# Patient Record
Sex: FEMALE | Race: WHITE | Employment: OTHER | URBAN - METROPOLITAN AREA
[De-identification: names, ages, dates, MRNs, and addresses within clinical notes are randomized per-mention and may not be internally consistent; named-entity substitution may affect disease eponyms.]

---

## 2018-03-12 ENCOUNTER — OFFICE VISIT (OUTPATIENT)
Dept: SURGERY | Age: 58
End: 2018-03-12
Payer: MEDICARE

## 2018-03-12 ENCOUNTER — TELEPHONE (OUTPATIENT)
Dept: SURGERY | Age: 58
End: 2018-03-12

## 2018-03-12 VITALS — SYSTOLIC BLOOD PRESSURE: 162 MMHG | HEART RATE: 83 BPM | DIASTOLIC BLOOD PRESSURE: 78 MMHG | TEMPERATURE: 99.6 F

## 2018-03-12 DIAGNOSIS — K94.19 FISTULA OF ENTEROSTOMY (HCC): Primary | ICD-10-CM

## 2018-03-12 PROCEDURE — G8427 DOCREV CUR MEDS BY ELIG CLIN: HCPCS | Performed by: SURGERY

## 2018-03-12 PROCEDURE — 3017F COLORECTAL CA SCREEN DOC REV: CPT | Performed by: SURGERY

## 2018-03-12 PROCEDURE — 99204 OFFICE O/P NEW MOD 45 MIN: CPT | Performed by: SURGERY

## 2018-03-12 PROCEDURE — 1036F TOBACCO NON-USER: CPT | Performed by: SURGERY

## 2018-03-12 PROCEDURE — 3014F SCREEN MAMMO DOC REV: CPT | Performed by: SURGERY

## 2018-03-12 PROCEDURE — G8421 BMI NOT CALCULATED: HCPCS | Performed by: SURGERY

## 2018-03-12 PROCEDURE — G8484 FLU IMMUNIZE NO ADMIN: HCPCS | Performed by: SURGERY

## 2018-03-12 RX ORDER — SULFAMETHOXAZOLE AND TRIMETHOPRIM 800; 160 MG/1; MG/1
1 TABLET ORAL 2 TIMES DAILY
Qty: 20 TABLET | Refills: 0 | Status: SHIPPED | OUTPATIENT
Start: 2018-03-12 | End: 2018-03-22

## 2018-03-12 RX ORDER — METRONIDAZOLE 500 MG/1
500 TABLET ORAL 3 TIMES DAILY
Qty: 30 TABLET | Refills: 0 | Status: SHIPPED | OUTPATIENT
Start: 2018-03-12 | End: 2018-03-22

## 2018-03-12 NOTE — PROGRESS NOTES
visit. Allergies   Allergen Reactions    Asa [Aspirin]      sensitivity    Codeine      Vomiting not sure if caused by codeine    Tape Anabela Carter Tape] Other (See Comments)     Peels skin off       The patient has a family history that is negative for severe cardiovascular or respiratory issues, negative for reaction to anesthesia. Social History     Social History    Marital status:      Spouse name: N/A    Number of children: N/A    Years of education: N/A     Occupational History    Not on file.      Social History Main Topics    Smoking status: Former Smoker    Smokeless tobacco: Not on file      Comment: quit smoking 17 yrs ago    Alcohol use No      Comment: rarely    Drug use: No    Sexual activity: No     Other Topics Concern    Not on file     Social History Narrative    No narrative on file           Review of Systems  Review of Systems -  General ROS: negative for - chills, fatigue or malaise  ENT ROS: negative for - hearing change, nasal congestion or nasal discharge  Allergy and Immunology ROS: negative for - hives, itchy/watery eyes or nasal congestion  Hematological and Lymphatic ROS: negative for - blood clots, blood transfusions, bruising or fatigue  Endocrine ROS: negative for - malaise/lethargy, mood swings, palpitations or polydipsia/polyuria  Respiratory ROS: negative for - sputum changes, stridor, tachypnea or wheezing  Cardiovascular ROS: negative for - irregular heartbeat, loss of consciousness, murmur or orthopnea  Gastrointestinal ROS: negative for - constipation, diarrhea, gas/bloating, heartburn or hematemesis  Genito-Urinary ROS: negative for -  genital discharge, genital ulcers or hematuria  Musculoskeletal ROS: negative for - gait disturbance, muscle pain or muscular weakness    Physical exam:   BP (!) 162/78   Pulse 83   Temp 99.6 °F (37.6 °C)   General appearance:  NAD  Head: NCAT, PERRLA, EOMI, red conjunctiva  Neck: supple, no masses  Lungs: CTAB, equal chest rise bilateral  Heart: Reg rate  Abdomen: soft, nontender, nondistended, [id number] wound in LLQ  Skin; no lesions  Gu: no cva tenderness  Extremities: extremities normal, atraumatic, no cyanosis or edema      Radiology:  CT abdomen/pelvis: ordered    Assessment:  62 y.o. female with draining abdominal wound likely fistula    Plan: Will give abx and get CT abd to assess.   Betsey Baca MD  1:33 PM  3/12/2018

## 2018-03-12 NOTE — TELEPHONE ENCOUNTER
Per the order of Dr. Agustin Rogers, patient has been scheduled for CT Scan at [facility] on 3.13.18. Patient provided with instructions for testing during office visit and verbalized understanding. Patient scheduled to return to the office on 3.19.18 to review results of testing with Dr. Agustin Rogers. Patient also provided with a prescription to have her labs drawn piror to CT Scan. Patient also provided with verbal instructions during office visit along with order for CT Scan. Patient has Graeme Mccarty and no authorization is required for CT Scan.   Electronically signed by Reinaldo Fuller on 3/12/18 at 3:06 PM

## 2018-03-13 ENCOUNTER — HOSPITAL ENCOUNTER (OUTPATIENT)
Dept: CT IMAGING | Age: 58
Discharge: HOME | End: 2018-03-13
Payer: MEDICARE

## 2018-03-13 DIAGNOSIS — K94.19 FISTULA OF ENTEROSTOMY (HCC): ICD-10-CM

## 2018-03-13 LAB
ANION GAP SERPL CALCULATED.3IONS-SCNC: 10 MMOL/L (ref 7–16)
BUN BLDV-MCNC: 16 MG/DL (ref 6–20)
CALCIUM SERPL-MCNC: 9.1 MG/DL (ref 8.6–10.2)
CHLORIDE BLD-SCNC: 95 MMOL/L (ref 98–107)
CO2: 30 MMOL/L (ref 22–29)
CREAT SERPL-MCNC: 1 MG/DL (ref 0.5–1)
GFR AFRICAN AMERICAN: >60
GFR NON-AFRICAN AMERICAN: 57 ML/MIN/1.73
GLUCOSE BLD-MCNC: 454 MG/DL (ref 74–109)
POTASSIUM SERPL-SCNC: 4.3 MMOL/L (ref 3.5–5)
SODIUM BLD-SCNC: 135 MMOL/L (ref 132–146)

## 2018-03-13 PROCEDURE — 6360000004 HC RX CONTRAST MEDICATION: Performed by: RADIOLOGY

## 2018-03-13 PROCEDURE — 36415 COLL VENOUS BLD VENIPUNCTURE: CPT

## 2018-03-13 PROCEDURE — 80048 BASIC METABOLIC PNL TOTAL CA: CPT

## 2018-03-13 PROCEDURE — 74177 CT ABD & PELVIS W/CONTRAST: CPT

## 2018-03-13 RX ADMIN — IOPAMIDOL 80 ML: 755 INJECTION, SOLUTION INTRAVENOUS at 14:19

## 2018-03-13 RX ADMIN — IOHEXOL 50 ML: 240 INJECTION, SOLUTION INTRATHECAL; INTRAVASCULAR; INTRAVENOUS; ORAL at 14:18

## 2018-03-19 ENCOUNTER — OFFICE VISIT (OUTPATIENT)
Dept: SURGERY | Age: 58
End: 2018-03-19
Payer: MEDICARE

## 2018-03-19 ENCOUNTER — TELEPHONE (OUTPATIENT)
Dept: SURGERY | Age: 58
End: 2018-03-19

## 2018-03-19 VITALS
TEMPERATURE: 97.8 F | HEIGHT: 64 IN | HEART RATE: 59 BPM | BODY MASS INDEX: 50.02 KG/M2 | DIASTOLIC BLOOD PRESSURE: 71 MMHG | SYSTOLIC BLOOD PRESSURE: 157 MMHG | WEIGHT: 293 LBS

## 2018-03-19 DIAGNOSIS — S31.109S OPEN ABDOMINAL WALL WOUND, SEQUELA: Primary | ICD-10-CM

## 2018-03-19 PROCEDURE — 99212 OFFICE O/P EST SF 10 MIN: CPT | Performed by: SURGERY

## 2018-03-19 PROCEDURE — G8427 DOCREV CUR MEDS BY ELIG CLIN: HCPCS | Performed by: SURGERY

## 2018-03-19 PROCEDURE — G8417 CALC BMI ABV UP PARAM F/U: HCPCS | Performed by: SURGERY

## 2018-03-19 PROCEDURE — 3014F SCREEN MAMMO DOC REV: CPT | Performed by: SURGERY

## 2018-03-19 PROCEDURE — 1036F TOBACCO NON-USER: CPT | Performed by: SURGERY

## 2018-03-19 PROCEDURE — G8484 FLU IMMUNIZE NO ADMIN: HCPCS | Performed by: SURGERY

## 2018-03-19 PROCEDURE — 3017F COLORECTAL CA SCREEN DOC REV: CPT | Performed by: SURGERY

## 2018-03-19 NOTE — PROGRESS NOTES
Surgery Progress Note            Chief complaint:   Patient Active Problem List   Diagnosis    Open abdominal wall wound       S: reviewed CT and discussed need for surgery     O:   Vitals:    03/19/18 1408   BP: (!) 157/71   Pulse: 59   Temp: 97.8 °F (36.6 °C)     No intake or output data in the 24 hours ending 03/19/18 1422        Labs:  No results for input(s): WBC, HGB, HCT in the last 72 hours. Invalid input(s): PLR  Lab Results   Component Value Date    CREATININE 1.0 03/13/2018    BUN 16 03/13/2018     03/13/2018    K 4.3 03/13/2018    CL 95 (L) 03/13/2018    CO2 30 (H) 03/13/2018     No results for input(s): LIPASE, AMYLASE in the last 72 hours.     Physical exam:   BP (!) 157/71 (Site: Left Arm, Position: Sitting, Cuff Size: Medium Adult)   Pulse 59   Temp 97.8 °F (36.6 °C) (Temporal)   Ht 5' 4\" (1.626 m)   Wt (!) 390 lb (176.9 kg)   BMI 66.94 kg/m²   General appearance: NAD  Head: NCAT  Neck: supple, no masses  Lungs: equal chest rise bilateral  Heart: S1S2 present  Abdomen: soft, nontender, nondistended,  Abdominal wall wound without signs of infecton  Skin; no lesions  Gu: no cva tenderness  Extremities: extremities normal, atraumatic, no cyanosis or edema    A:  Abdominal wall wound with tunneling    P: cont moist to dry packing and will debride in OR     Nel Ford MD  3/19/2018'

## 2018-03-23 ENCOUNTER — PREP FOR PROCEDURE (OUTPATIENT)
Dept: SURGERY | Age: 58
End: 2018-03-23

## 2018-03-23 RX ORDER — SODIUM CHLORIDE, SODIUM LACTATE, POTASSIUM CHLORIDE, CALCIUM CHLORIDE 600; 310; 30; 20 MG/100ML; MG/100ML; MG/100ML; MG/100ML
INJECTION, SOLUTION INTRAVENOUS CONTINUOUS
Status: CANCELLED | OUTPATIENT
Start: 2018-03-23

## 2018-03-23 RX ORDER — SODIUM CHLORIDE 0.9 % (FLUSH) 0.9 %
10 SYRINGE (ML) INJECTION PRN
Status: CANCELLED | OUTPATIENT
Start: 2018-03-23

## 2018-03-23 RX ORDER — SODIUM CHLORIDE 0.9 % (FLUSH) 0.9 %
10 SYRINGE (ML) INJECTION EVERY 12 HOURS SCHEDULED
Status: CANCELLED | OUTPATIENT
Start: 2018-03-23

## 2018-03-29 ENCOUNTER — HOSPITAL ENCOUNTER (OUTPATIENT)
Dept: PREADMISSION TESTING | Age: 58
Discharge: HOME | End: 2018-03-29
Payer: MEDICARE

## 2018-03-29 ENCOUNTER — TELEPHONE (OUTPATIENT)
Dept: SURGERY | Age: 58
End: 2018-03-29

## 2018-03-29 VITALS
BODY MASS INDEX: 50.02 KG/M2 | HEIGHT: 64 IN | DIASTOLIC BLOOD PRESSURE: 70 MMHG | SYSTOLIC BLOOD PRESSURE: 140 MMHG | OXYGEN SATURATION: 95 % | TEMPERATURE: 97.5 F | WEIGHT: 293 LBS | RESPIRATION RATE: 16 BRPM | HEART RATE: 66 BPM

## 2018-03-29 DIAGNOSIS — Z01.818 PREOPERATIVE TESTING: Primary | ICD-10-CM

## 2018-03-29 LAB
ANION GAP SERPL CALCULATED.3IONS-SCNC: 10 MMOL/L (ref 7–16)
BUN BLDV-MCNC: 14 MG/DL (ref 6–20)
CALCIUM SERPL-MCNC: 9 MG/DL (ref 8.6–10.2)
CHLORIDE BLD-SCNC: 99 MMOL/L (ref 98–107)
CO2: 29 MMOL/L (ref 22–29)
CREAT SERPL-MCNC: 0.7 MG/DL (ref 0.5–1)
EKG ATRIAL RATE: 60 BPM
EKG P AXIS: 83 DEGREES
EKG P-R INTERVAL: 130 MS
EKG Q-T INTERVAL: 436 MS
EKG QRS DURATION: 78 MS
EKG QTC CALCULATION (BAZETT): 436 MS
EKG R AXIS: 29 DEGREES
EKG T AXIS: 62 DEGREES
EKG VENTRICULAR RATE: 60 BPM
GFR AFRICAN AMERICAN: >60
GFR NON-AFRICAN AMERICAN: >60 ML/MIN/1.73
GLUCOSE BLD-MCNC: 419 MG/DL (ref 74–109)
HCT VFR BLD CALC: 35.5 % (ref 34–48)
HEMOGLOBIN: 11.1 G/DL (ref 11.5–15.5)
MCH RBC QN AUTO: 27.5 PG (ref 26–35)
MCHC RBC AUTO-ENTMCNC: 31.3 % (ref 32–34.5)
MCV RBC AUTO: 87.9 FL (ref 80–99.9)
PDW BLD-RTO: 15.5 FL (ref 11.5–15)
PLATELET # BLD: 191 E9/L (ref 130–450)
PMV BLD AUTO: 12.7 FL (ref 7–12)
POTASSIUM REFLEX MAGNESIUM: 4.2 MMOL/L (ref 3.5–5)
RBC # BLD: 4.04 E12/L (ref 3.5–5.5)
SODIUM BLD-SCNC: 138 MMOL/L (ref 132–146)
WBC # BLD: 5.5 E9/L (ref 4.5–11.5)

## 2018-03-29 PROCEDURE — 93005 ELECTROCARDIOGRAM TRACING: CPT | Performed by: ANESTHESIOLOGY

## 2018-03-29 PROCEDURE — 85027 COMPLETE CBC AUTOMATED: CPT

## 2018-03-29 PROCEDURE — 36415 COLL VENOUS BLD VENIPUNCTURE: CPT

## 2018-03-29 PROCEDURE — 80048 BASIC METABOLIC PNL TOTAL CA: CPT

## 2018-03-29 PROCEDURE — 93010 ELECTROCARDIOGRAM REPORT: CPT | Performed by: INTERNAL MEDICINE

## 2018-03-29 NOTE — PROGRESS NOTES
day of surgery. All body piercing jewelry must be removed. 12. Shower the night before surgery with _x__Antibacterial soap ___Hibiclens. 15. Remember to bring Blood Bank bracelet to the hospital on the day of surgery. 14. If you have a Living Will and Durable Power of  for Healthcare, please bring in a copy. 15. If appropriate bring crutches, inspirex, etc... 12. Notify your Surgeon if you develop any illness between now and surgery time, cough, cold, fever, sore throat, nausea, vomiting, etc.  Please notify your surgeon if you experience dizziness, shortness of breath or blurred vision between now & the time of your surgery. 17. If you have ___dentures, they will be removed before going to the OR; we will provide you a container. If you wear ___contact lenses or ___glasses, they will be removed; please bring a case for them. 18. To provide excellent care visitors will be limited to one in the room at any given time. 19. Please bring picture ID and insurance card. 20. Sleep apnea patients need to bring CPAP to hospital on day of surgery. 21. Visit our web site for additional information: [email or web address]    22. During flu season no children under the age of 15 are permitted in the hospital for the safety of all patients. 23. Other                  Please call pre admission testing if you have any further questions.    [address] [facility]     [address]

## 2018-04-18 ENCOUNTER — TELEPHONE (OUTPATIENT)
Dept: SURGERY | Age: 58
End: 2018-04-18

## 2018-04-20 ENCOUNTER — PRE-EVALUATION (OUTPATIENT)
Dept: SURGERY | Age: 58
End: 2018-04-20

## 2018-04-24 ENCOUNTER — PREP FOR PROCEDURE (OUTPATIENT)
Dept: SURGERY | Age: 58
End: 2018-04-24

## 2018-04-24 RX ORDER — SODIUM CHLORIDE, SODIUM LACTATE, POTASSIUM CHLORIDE, CALCIUM CHLORIDE 600; 310; 30; 20 MG/100ML; MG/100ML; MG/100ML; MG/100ML
INJECTION, SOLUTION INTRAVENOUS CONTINUOUS
Status: CANCELLED | OUTPATIENT
Start: 2018-04-24

## 2018-04-24 RX ORDER — SODIUM CHLORIDE 0.9 % (FLUSH) 0.9 %
10 SYRINGE (ML) INJECTION PRN
Status: CANCELLED | OUTPATIENT
Start: 2018-04-24

## 2018-04-24 RX ORDER — SODIUM CHLORIDE 0.9 % (FLUSH) 0.9 %
10 SYRINGE (ML) INJECTION EVERY 12 HOURS SCHEDULED
Status: CANCELLED | OUTPATIENT
Start: 2018-04-24

## 2018-04-30 RX ORDER — ACETAMINOPHEN 500 MG
500 TABLET ORAL EVERY 6 HOURS PRN
COMMUNITY

## 2018-04-30 RX ORDER — COLCHICINE 0.6 MG/1
0.6 TABLET ORAL 2 TIMES DAILY
COMMUNITY
End: 2020-05-31

## 2018-05-01 ENCOUNTER — HOSPITAL ENCOUNTER (OUTPATIENT)
Dept: PREADMISSION TESTING | Age: 58
Discharge: HOME | End: 2018-05-01
Payer: MEDICARE

## 2018-05-01 VITALS
RESPIRATION RATE: 20 BRPM | BODY MASS INDEX: 50.02 KG/M2 | HEART RATE: 59 BPM | DIASTOLIC BLOOD PRESSURE: 68 MMHG | HEIGHT: 64 IN | WEIGHT: 293 LBS | TEMPERATURE: 97.9 F | SYSTOLIC BLOOD PRESSURE: 144 MMHG | OXYGEN SATURATION: 98 %

## 2018-05-01 DIAGNOSIS — S31.109D OPEN WOUND OF ABDOMINAL WALL, SUBSEQUENT ENCOUNTER: Primary | ICD-10-CM

## 2018-05-01 LAB
ANION GAP SERPL CALCULATED.3IONS-SCNC: 9 MMOL/L (ref 7–16)
BUN BLDV-MCNC: 25 MG/DL (ref 6–20)
CALCIUM SERPL-MCNC: 9 MG/DL (ref 8.6–10.2)
CHLORIDE BLD-SCNC: 103 MMOL/L (ref 98–107)
CO2: 29 MMOL/L (ref 22–29)
CREAT SERPL-MCNC: 0.8 MG/DL (ref 0.5–1)
GFR AFRICAN AMERICAN: >60
GFR NON-AFRICAN AMERICAN: >60 ML/MIN/1.73
GLUCOSE BLD-MCNC: 101 MG/DL (ref 74–109)
HCT VFR BLD CALC: 33.7 % (ref 34–48)
HEMOGLOBIN: 10.9 G/DL (ref 11.5–15.5)
MCH RBC QN AUTO: 28.1 PG (ref 26–35)
MCHC RBC AUTO-ENTMCNC: 32.3 % (ref 32–34.5)
MCV RBC AUTO: 86.9 FL (ref 80–99.9)
PDW BLD-RTO: 14.8 FL (ref 11.5–15)
PLATELET # BLD: 205 E9/L (ref 130–450)
PMV BLD AUTO: 11.8 FL (ref 7–12)
POTASSIUM REFLEX MAGNESIUM: 4.1 MMOL/L (ref 3.5–5)
RBC # BLD: 3.88 E12/L (ref 3.5–5.5)
SODIUM BLD-SCNC: 141 MMOL/L (ref 132–146)
WBC # BLD: 6.5 E9/L (ref 4.5–11.5)

## 2018-05-01 PROCEDURE — 80048 BASIC METABOLIC PNL TOTAL CA: CPT

## 2018-05-01 PROCEDURE — 85027 COMPLETE CBC AUTOMATED: CPT

## 2018-05-01 PROCEDURE — 36415 COLL VENOUS BLD VENIPUNCTURE: CPT

## 2018-05-01 RX ORDER — ERGOCALCIFEROL 1.25 MG/1
50000 CAPSULE ORAL WEEKLY
COMMUNITY

## 2018-05-01 ASSESSMENT — PAIN DESCRIPTION - PAIN TYPE: TYPE: ACUTE PAIN

## 2018-05-01 ASSESSMENT — PAIN SCALES - GENERAL: PAINLEVEL_OUTOF10: 10

## 2018-05-01 ASSESSMENT — PAIN DESCRIPTION - ORIENTATION: ORIENTATION: RIGHT

## 2018-05-01 ASSESSMENT — PAIN DESCRIPTION - LOCATION: LOCATION: HAND

## 2018-05-01 ASSESSMENT — PAIN DESCRIPTION - ONSET: ONSET: ON-GOING

## 2018-05-02 ENCOUNTER — ANESTHESIA (OUTPATIENT)
Dept: OPERATING ROOM | Age: 58
End: 2018-05-02
Payer: MEDICARE

## 2018-05-02 ENCOUNTER — HOSPITAL ENCOUNTER (OUTPATIENT)
Age: 58
Setting detail: OUTPATIENT SURGERY
Discharge: HOME | End: 2018-05-02
Attending: SURGERY | Admitting: SURGERY
Payer: MEDICARE

## 2018-05-02 ENCOUNTER — ANESTHESIA EVENT (OUTPATIENT)
Dept: OPERATING ROOM | Age: 58
End: 2018-05-02
Payer: MEDICARE

## 2018-05-02 VITALS
BODY MASS INDEX: 50.02 KG/M2 | OXYGEN SATURATION: 97 % | TEMPERATURE: 96.8 F | HEART RATE: 53 BPM | DIASTOLIC BLOOD PRESSURE: 67 MMHG | WEIGHT: 293 LBS | HEIGHT: 64 IN | SYSTOLIC BLOOD PRESSURE: 123 MMHG | RESPIRATION RATE: 20 BRPM

## 2018-05-02 VITALS
SYSTOLIC BLOOD PRESSURE: 96 MMHG | DIASTOLIC BLOOD PRESSURE: 63 MMHG | OXYGEN SATURATION: 100 % | RESPIRATION RATE: 14 BRPM

## 2018-05-02 DIAGNOSIS — S31.109D OPEN WOUND OF ABDOMINAL WALL, SUBSEQUENT ENCOUNTER: Primary | ICD-10-CM

## 2018-05-02 PROCEDURE — 6370000000 HC RX 637 (ALT 250 FOR IP): Performed by: ANESTHESIOLOGY

## 2018-05-02 PROCEDURE — 7100000010 HC PHASE II RECOVERY - FIRST 15 MIN: Performed by: SURGERY

## 2018-05-02 PROCEDURE — 2580000003 HC RX 258: Performed by: SURGERY

## 2018-05-02 PROCEDURE — 2709999900 HC NON-CHARGEABLE SUPPLY: Performed by: SURGERY

## 2018-05-02 PROCEDURE — 7100000011 HC PHASE II RECOVERY - ADDTL 15 MIN: Performed by: SURGERY

## 2018-05-02 PROCEDURE — 11043 DBRDMT MUSC&/FSCA 1ST 20/<: CPT | Performed by: SURGERY

## 2018-05-02 PROCEDURE — 3700000000 HC ANESTHESIA ATTENDED CARE: Performed by: SURGERY

## 2018-05-02 PROCEDURE — 3600000002 HC SURGERY LEVEL 2 BASE: Performed by: SURGERY

## 2018-05-02 PROCEDURE — 6360000002 HC RX W HCPCS: Performed by: NURSE ANESTHETIST, CERTIFIED REGISTERED

## 2018-05-02 PROCEDURE — 11046 DBRDMT MUSC&/FSCA EA ADDL: CPT | Performed by: SURGERY

## 2018-05-02 PROCEDURE — 88304 TISSUE EXAM BY PATHOLOGIST: CPT

## 2018-05-02 PROCEDURE — 2500000003 HC RX 250 WO HCPCS: Performed by: SURGERY

## 2018-05-02 PROCEDURE — 3700000001 HC ADD 15 MINUTES (ANESTHESIA): Performed by: SURGERY

## 2018-05-02 PROCEDURE — 99024 POSTOP FOLLOW-UP VISIT: CPT | Performed by: SURGERY

## 2018-05-02 PROCEDURE — 3600000012 HC SURGERY LEVEL 2 ADDTL 15MIN: Performed by: SURGERY

## 2018-05-02 RX ORDER — FENTANYL CITRATE 50 UG/ML
INJECTION, SOLUTION INTRAMUSCULAR; INTRAVENOUS PRN
Status: DISCONTINUED | OUTPATIENT
Start: 2018-05-02 | End: 2018-05-02 | Stop reason: SDUPTHER

## 2018-05-02 RX ORDER — CLINDAMYCIN PHOSPHATE 900 MG/50ML
900 INJECTION INTRAVENOUS
Status: COMPLETED | OUTPATIENT
Start: 2018-05-02 | End: 2018-05-02

## 2018-05-02 RX ORDER — OXYCODONE HYDROCHLORIDE AND ACETAMINOPHEN 5; 325 MG/1; MG/1
1 TABLET ORAL EVERY 6 HOURS PRN
Qty: 20 TABLET | Refills: 0 | Status: SHIPPED | OUTPATIENT
Start: 2018-05-02 | End: 2018-05-07

## 2018-05-02 RX ORDER — SODIUM CHLORIDE, SODIUM LACTATE, POTASSIUM CHLORIDE, CALCIUM CHLORIDE 600; 310; 30; 20 MG/100ML; MG/100ML; MG/100ML; MG/100ML
INJECTION, SOLUTION INTRAVENOUS CONTINUOUS
Status: DISCONTINUED | OUTPATIENT
Start: 2018-05-02 | End: 2018-05-02 | Stop reason: HOSPADM

## 2018-05-02 RX ORDER — PROPOFOL 10 MG/ML
INJECTION, EMULSION INTRAVENOUS CONTINUOUS PRN
Status: DISCONTINUED | OUTPATIENT
Start: 2018-05-02 | End: 2018-05-02 | Stop reason: SDUPTHER

## 2018-05-02 RX ORDER — SODIUM CHLORIDE 0.9 % (FLUSH) 0.9 %
10 SYRINGE (ML) INJECTION PRN
Status: DISCONTINUED | OUTPATIENT
Start: 2018-05-02 | End: 2018-05-02 | Stop reason: HOSPADM

## 2018-05-02 RX ORDER — OXYCODONE HYDROCHLORIDE AND ACETAMINOPHEN 5; 325 MG/1; MG/1
1 TABLET ORAL ONCE
Status: COMPLETED | OUTPATIENT
Start: 2018-05-02 | End: 2018-05-02

## 2018-05-02 RX ORDER — BUPIVACAINE HYDROCHLORIDE AND EPINEPHRINE 2.5; 5 MG/ML; UG/ML
INJECTION, SOLUTION EPIDURAL; INFILTRATION; INTRACAUDAL; PERINEURAL PRN
Status: DISCONTINUED | OUTPATIENT
Start: 2018-05-02 | End: 2018-05-02 | Stop reason: HOSPADM

## 2018-05-02 RX ORDER — MEPERIDINE HYDROCHLORIDE 25 MG/ML
12.5 INJECTION INTRAMUSCULAR; INTRAVENOUS; SUBCUTANEOUS EVERY 5 MIN PRN
Status: DISCONTINUED | OUTPATIENT
Start: 2018-05-02 | End: 2018-05-02 | Stop reason: HOSPADM

## 2018-05-02 RX ORDER — MIDAZOLAM HYDROCHLORIDE 1 MG/ML
INJECTION INTRAMUSCULAR; INTRAVENOUS PRN
Status: DISCONTINUED | OUTPATIENT
Start: 2018-05-02 | End: 2018-05-02 | Stop reason: SDUPTHER

## 2018-05-02 RX ORDER — SODIUM CHLORIDE 0.9 % (FLUSH) 0.9 %
10 SYRINGE (ML) INJECTION EVERY 12 HOURS SCHEDULED
Status: DISCONTINUED | OUTPATIENT
Start: 2018-05-02 | End: 2018-05-02 | Stop reason: HOSPADM

## 2018-05-02 RX ADMIN — FENTANYL CITRATE 50 MCG: 50 INJECTION, SOLUTION INTRAMUSCULAR; INTRAVENOUS at 08:00

## 2018-05-02 RX ADMIN — FENTANYL CITRATE 50 MCG: 50 INJECTION, SOLUTION INTRAMUSCULAR; INTRAVENOUS at 07:52

## 2018-05-02 RX ADMIN — SODIUM CHLORIDE, POTASSIUM CHLORIDE, SODIUM LACTATE AND CALCIUM CHLORIDE: 600; 310; 30; 20 INJECTION, SOLUTION INTRAVENOUS at 07:49

## 2018-05-02 RX ADMIN — SODIUM CHLORIDE, POTASSIUM CHLORIDE, SODIUM LACTATE AND CALCIUM CHLORIDE: 600; 310; 30; 20 INJECTION, SOLUTION INTRAVENOUS at 07:29

## 2018-05-02 RX ADMIN — PROPOFOL 85 MCG/KG/MIN: 10 INJECTION, EMULSION INTRAVENOUS at 07:52

## 2018-05-02 RX ADMIN — CLINDAMYCIN PHOSPHATE 900 MG: 18 INJECTION, SOLUTION INTRAMUSCULAR; INTRAVENOUS at 07:49

## 2018-05-02 RX ADMIN — OXYCODONE HYDROCHLORIDE AND ACETAMINOPHEN 1 TABLET: 5; 325 TABLET ORAL at 09:10

## 2018-05-02 RX ADMIN — MIDAZOLAM HYDROCHLORIDE 2 MG: 1 INJECTION INTRAMUSCULAR; INTRAVENOUS at 07:49

## 2018-05-02 ASSESSMENT — PULMONARY FUNCTION TESTS
PIF_VALUE: 0
PIF_VALUE: 1
PIF_VALUE: 1
PIF_VALUE: 0
PIF_VALUE: 1

## 2018-05-02 ASSESSMENT — PAIN DESCRIPTION - ORIENTATION: ORIENTATION: MID

## 2018-05-02 ASSESSMENT — PAIN DESCRIPTION - DESCRIPTORS
DESCRIPTORS: ACHING;DULL
DESCRIPTORS: BURNING
DESCRIPTORS: HEADACHE

## 2018-05-02 ASSESSMENT — PAIN DESCRIPTION - PROGRESSION: CLINICAL_PROGRESSION: NOT CHANGED

## 2018-05-02 ASSESSMENT — PAIN DESCRIPTION - FREQUENCY: FREQUENCY: INTERMITTENT

## 2018-05-02 ASSESSMENT — PAIN SCALES - GENERAL
PAINLEVEL_OUTOF10: 2
PAINLEVEL_OUTOF10: 6
PAINLEVEL_OUTOF10: 6

## 2018-05-02 ASSESSMENT — PAIN DESCRIPTION - ONSET: ONSET: ON-GOING

## 2018-05-02 ASSESSMENT — PAIN DESCRIPTION - LOCATION: LOCATION: ABDOMEN

## 2018-05-02 ASSESSMENT — PAIN DESCRIPTION - PAIN TYPE: TYPE: ACUTE PAIN

## 2018-05-02 ASSESSMENT — PAIN - FUNCTIONAL ASSESSMENT: PAIN_FUNCTIONAL_ASSESSMENT: 0-10

## 2018-05-21 ENCOUNTER — OFFICE VISIT (OUTPATIENT)
Dept: SURGERY | Age: 58
End: 2018-05-21
Payer: MEDICARE

## 2018-05-21 VITALS
SYSTOLIC BLOOD PRESSURE: 132 MMHG | WEIGHT: 293 LBS | HEIGHT: 64 IN | DIASTOLIC BLOOD PRESSURE: 74 MMHG | BODY MASS INDEX: 50.02 KG/M2

## 2018-05-21 DIAGNOSIS — S31.109D OPEN ABDOMINAL WALL WOUND, SUBSEQUENT ENCOUNTER: Primary | ICD-10-CM

## 2018-05-21 PROCEDURE — 1036F TOBACCO NON-USER: CPT | Performed by: SURGERY

## 2018-05-21 PROCEDURE — 99213 OFFICE O/P EST LOW 20 MIN: CPT | Performed by: SURGERY

## 2018-05-21 PROCEDURE — 3017F COLORECTAL CA SCREEN DOC REV: CPT | Performed by: SURGERY

## 2018-05-21 PROCEDURE — G8417 CALC BMI ABV UP PARAM F/U: HCPCS | Performed by: SURGERY

## 2018-05-21 PROCEDURE — G8427 DOCREV CUR MEDS BY ELIG CLIN: HCPCS | Performed by: SURGERY

## 2018-08-16 ENCOUNTER — TELEPHONE (OUTPATIENT)
Dept: SURGERY | Age: 58
End: 2018-08-16

## 2018-08-16 RX ORDER — CLINDAMYCIN HYDROCHLORIDE 300 MG/1
CAPSULE ORAL
Qty: 60 CAPSULE | Refills: 0 | Status: SHIPPED | OUTPATIENT
Start: 2018-08-16

## 2018-08-16 NOTE — TELEPHONE ENCOUNTER
Pt called c/o greenish discharge on the dressing of her abdominal wound. Pt says the wound is healing and getting smaller and she doesn't have a fever or any other sx. She's concerned and would like an antibiotic. Message sent to Dr Amber Hendricks and per his order, Rx sent to pharmacy for Clindamycin 600 mg tid x 10 days. Pt informed and verbalized understanding.     Niala Ambriz MA

## 2020-05-31 ENCOUNTER — HOSPITAL ENCOUNTER (EMERGENCY)
Age: 60
Discharge: HOME | End: 2020-05-31
Payer: COMMERCIAL

## 2020-05-31 VITALS
BODY MASS INDEX: 73.12 KG/M2 | RESPIRATION RATE: 16 BRPM | DIASTOLIC BLOOD PRESSURE: 57 MMHG | WEIGHT: 293 LBS | SYSTOLIC BLOOD PRESSURE: 117 MMHG | OXYGEN SATURATION: 97 % | TEMPERATURE: 97.7 F | HEART RATE: 62 BPM

## 2020-05-31 PROCEDURE — 99212 OFFICE O/P EST SF 10 MIN: CPT

## 2020-05-31 RX ORDER — AMOXICILLIN 500 MG/1
500 CAPSULE ORAL 3 TIMES DAILY
Qty: 30 CAPSULE | Refills: 0 | Status: SHIPPED | OUTPATIENT
Start: 2020-05-31 | End: 2020-06-10

## 2020-05-31 ASSESSMENT — PAIN DESCRIPTION - PAIN TYPE: TYPE: CHRONIC PAIN

## 2020-05-31 ASSESSMENT — PAIN DESCRIPTION - ORIENTATION: ORIENTATION: LOWER;LEFT

## 2020-05-31 ASSESSMENT — PAIN DESCRIPTION - DESCRIPTORS: DESCRIPTORS: ACHING

## 2020-05-31 ASSESSMENT — PAIN DESCRIPTION - LOCATION: LOCATION: TEETH

## 2020-05-31 ASSESSMENT — PAIN SCALES - GENERAL: PAINLEVEL_OUTOF10: 4

## 2020-05-31 NOTE — ED PROVIDER NOTES
This is a 78-year-old female that presents to urgent care complaining of left lower dental pain for the past couple weeks. Has not been able to follow-up with her dentist.  Denies any difficulty breathing or swallowing. No shortness of breath. No fever or chills. Review of Systems   Constitutional:        Pertinent positives and negatives are stated within HPI, all other systems reviewed and are negative. Physical Exam  Vitals signs and nursing note reviewed. Constitutional:       Appearance: She is well-developed. HENT:      Head: Normocephalic and atraumatic. Jaw: There is normal jaw occlusion. No trismus. Right Ear: Hearing, tympanic membrane, ear canal and external ear normal.      Left Ear: Hearing, tympanic membrane, ear canal and external ear normal.      Nose: Nose normal.      Right Sinus: No maxillary sinus tenderness or frontal sinus tenderness. Left Sinus: No maxillary sinus tenderness or frontal sinus tenderness. Mouth/Throat:      Dentition: No dental abscesses. Pharynx: Uvula midline. No pharyngeal swelling, oropharyngeal exudate, posterior oropharyngeal erythema or uvula swelling. Comments: Has some dental tenderness especially to the second and third left lower molars. There is some surrounding gum tenderness but no abscess formation at this time  Eyes:      General: Lids are normal.      Conjunctiva/sclera: Conjunctivae normal.      Pupils: Pupils are equal, round, and reactive to light. Neck:      Musculoskeletal: Normal range of motion and neck supple. Cardiovascular:      Rate and Rhythm: Normal rate and regular rhythm. Heart sounds: Normal heart sounds. No murmur. Pulmonary:      Effort: Pulmonary effort is normal.      Breath sounds: Normal breath sounds. Abdominal:      General: Bowel sounds are normal.      Palpations: Abdomen is soft. Abdomen is not rigid. Tenderness: There is no abdominal tenderness.  There is no guarding or rebound. Skin:     General: Skin is warm and dry. Findings: No abrasion or rash. Neurological:      Mental Status: She is alert and oriented to person, place, and time. GCS: GCS eye subscore is 4. GCS verbal subscore is 5. GCS motor subscore is 6. Cranial Nerves: No cranial nerve deficit. Sensory: No sensory deficit. Coordination: Coordination normal.      Gait: Gait normal.         Procedures    MDM  Number of Diagnoses or Management Options  Pain, dental:   Diagnosis management comments: Patient states she has a couple dentist she will try but I will also give her the referral to the dental clinic. Place her on amoxicillin she will take Tylenol for pain.           --------------------------------------------- PAST HISTORY ---------------------------------------------  Past Medical History:  has a past medical history of Arthritis, History of blood transfusion, Hypertension, Hypothyroidism, Sleep apnea, Surgical wound, non healing, and Type II or unspecified type diabetes mellitus without mention of complication, not stated as uncontrolled. Past Surgical History:  has a past surgical history that includes Tonsillectomy; Cholecystectomy; Colon surgery; Appendectomy; Tubal ligation; other surgical history (4/6/16); Upper gastrointestinal endoscopy; Abdomen surgery (10/23/2015 ); Cardiac surgery; and pr exc skin benig <5mm trunk,arm,leg (N/A, 5/2/2018). Social History:  reports that she has quit smoking. She has never used smokeless tobacco. She reports that she does not drink alcohol or use drugs. Family History: family history includes Cancer in her father; Heart Attack in her mother; Heart Disease in her mother. The patients home medications have been reviewed. Allergies: Asa [aspirin]; Codeine;  Other; and Tape [adhesive tape]    -------------------------------------------------- RESULTS -------------------------------------------------  No results found

## 2022-04-07 LAB
ALBUMIN SERPL-MCNC: NORMAL G/DL
ALP BLD-CCNC: NORMAL U/L
ALT SERPL-CCNC: NORMAL U/L
ANION GAP SERPL CALCULATED.3IONS-SCNC: NORMAL MMOL/L
AST SERPL-CCNC: NORMAL U/L
AVERAGE GLUCOSE: 220
BASOPHILS ABSOLUTE: NORMAL
BASOPHILS RELATIVE PERCENT: NORMAL
BILIRUB SERPL-MCNC: NORMAL MG/DL
BUN BLDV-MCNC: NORMAL MG/DL
CALCIUM SERPL-MCNC: NORMAL MG/DL
CHLORIDE BLD-SCNC: NORMAL MMOL/L
CHOLESTEROL, TOTAL: NORMAL
CHOLESTEROL/HDL RATIO: NORMAL
CO2: NORMAL
CREAT SERPL-MCNC: NORMAL MG/DL
CREATININE, URINE: 68
EOSINOPHILS ABSOLUTE: NORMAL
EOSINOPHILS RELATIVE PERCENT: NORMAL
GFR CALCULATED: NORMAL
GLUCOSE BLD-MCNC: NORMAL MG/DL
HBA1C MFR BLD: 9.3 %
HCT VFR BLD CALC: NORMAL %
HDLC SERPL-MCNC: NORMAL MG/DL
HEMOGLOBIN: NORMAL
LDL CHOLESTEROL CALCULATED: NORMAL
LYMPHOCYTES ABSOLUTE: NORMAL
LYMPHOCYTES RELATIVE PERCENT: NORMAL
MCH RBC QN AUTO: NORMAL PG
MCHC RBC AUTO-ENTMCNC: NORMAL G/DL
MCV RBC AUTO: NORMAL FL
MICROALBUMIN/CREAT 24H UR: 9.3 MG/G{CREAT}
MICROALBUMIN/CREAT UR-RTO: 136
MONOCYTES ABSOLUTE: NORMAL
MONOCYTES RELATIVE PERCENT: NORMAL
NEUTROPHILS ABSOLUTE: NORMAL
NEUTROPHILS RELATIVE PERCENT: NORMAL
NONHDLC SERPL-MCNC: NORMAL MG/DL
PLATELET # BLD: NORMAL 10*3/UL
PMV BLD AUTO: NORMAL FL
POTASSIUM SERPL-SCNC: NORMAL MMOL/L
RBC # BLD: NORMAL 10*6/UL
SODIUM BLD-SCNC: NORMAL MMOL/L
TOTAL PROTEIN: NORMAL
TRIGL SERPL-MCNC: NORMAL MG/DL
TSH SERPL DL<=0.05 MIU/L-ACNC: 3.2 UIU/ML
VLDLC SERPL CALC-MCNC: NORMAL MG/DL
WBC # BLD: NORMAL 10*3/UL

## 2022-08-02 RX ORDER — INSULIN GLARGINE 100 [IU]/ML
32 INJECTION, SOLUTION SUBCUTANEOUS 2 TIMES DAILY
COMMUNITY
Start: 2022-07-06 | End: 2022-08-04 | Stop reason: SDUPTHER

## 2022-08-04 RX ORDER — INSULIN GLARGINE 100 [IU]/ML
32 INJECTION, SOLUTION SUBCUTANEOUS 2 TIMES DAILY
Qty: 5 PEN | Refills: 0 | Status: SHIPPED
Start: 2022-08-04 | End: 2022-08-23

## 2022-08-23 RX ORDER — INSULIN GLARGINE 100 [IU]/ML
INJECTION, SOLUTION SUBCUTANEOUS
Qty: 15 ML | Refills: 10 | Status: SHIPPED | OUTPATIENT
Start: 2022-08-23

## 2022-08-29 RX ORDER — MONTELUKAST SODIUM 10 MG/1
10 TABLET ORAL DAILY
Qty: 90 TABLET | Refills: 0 | Status: SHIPPED
Start: 2022-08-29 | End: 2022-11-04

## 2022-08-29 RX ORDER — VENLAFAXINE 75 MG/1
75 TABLET ORAL DAILY
Qty: 90 TABLET | Refills: 0 | Status: SHIPPED | OUTPATIENT
Start: 2022-08-29

## 2022-11-02 RX ORDER — DAPAGLIFLOZIN 10 MG/1
TABLET, FILM COATED ORAL
COMMUNITY
Start: 2022-07-28 | End: 2022-11-04

## 2022-11-02 RX ORDER — ISOPROPYL ALCOHOL 70 ML/100ML
SWAB TOPICAL
COMMUNITY
Start: 2022-08-16

## 2022-11-02 RX ORDER — LANCETS
EACH MISCELLANEOUS
COMMUNITY
Start: 2022-08-05

## 2022-11-02 RX ORDER — LOSARTAN POTASSIUM 100 MG/1
100 TABLET ORAL DAILY
COMMUNITY
Start: 2022-08-05

## 2022-11-02 RX ORDER — AMLODIPINE BESYLATE 5 MG/1
TABLET ORAL
COMMUNITY
Start: 2022-08-05

## 2022-11-02 RX ORDER — BLOOD SUGAR DIAGNOSTIC
STRIP MISCELLANEOUS
COMMUNITY
Start: 2022-08-06

## 2022-11-02 RX ORDER — LOPERAMIDE HYDROCHLORIDE 2 MG/1
TABLET ORAL
COMMUNITY
Start: 2014-08-04 | End: 2022-11-04

## 2022-11-02 RX ORDER — FUROSEMIDE 20 MG/1
TABLET ORAL
COMMUNITY
Start: 2022-08-05

## 2022-11-02 RX ORDER — LEVOTHYROXINE SODIUM 88 UG/1
TABLET ORAL
COMMUNITY
Start: 2022-08-05 | End: 2022-11-30 | Stop reason: SDUPTHER

## 2022-11-04 ENCOUNTER — OFFICE VISIT (OUTPATIENT)
Dept: PRIMARY CARE CLINIC | Age: 62
End: 2022-11-04
Payer: MEDICARE

## 2022-11-04 VITALS
HEIGHT: 64 IN | WEIGHT: 293 LBS | OXYGEN SATURATION: 95 % | HEART RATE: 68 BPM | BODY MASS INDEX: 50.02 KG/M2 | TEMPERATURE: 97.7 F | DIASTOLIC BLOOD PRESSURE: 70 MMHG | SYSTOLIC BLOOD PRESSURE: 128 MMHG

## 2022-11-04 DIAGNOSIS — I10 ESSENTIAL (PRIMARY) HYPERTENSION: ICD-10-CM

## 2022-11-04 DIAGNOSIS — E05.90 HYPERTHYROIDISM: ICD-10-CM

## 2022-11-04 DIAGNOSIS — E11.42 DIABETIC POLYNEUROPATHY ASSOCIATED WITH TYPE 2 DIABETES MELLITUS (HCC): ICD-10-CM

## 2022-11-04 DIAGNOSIS — E11.65 TYPE 2 DIABETES MELLITUS WITH HYPERGLYCEMIA, WITH LONG-TERM CURRENT USE OF INSULIN (HCC): ICD-10-CM

## 2022-11-04 DIAGNOSIS — E11.65 TYPE 2 DIABETES MELLITUS WITH HYPERGLYCEMIA, WITH LONG-TERM CURRENT USE OF INSULIN (HCC): Primary | ICD-10-CM

## 2022-11-04 DIAGNOSIS — R25.2 MUSCLE CRAMPS: ICD-10-CM

## 2022-11-04 DIAGNOSIS — E11.9 DIABETES MELLITUS TYPE 2 IN NONOBESE (HCC): ICD-10-CM

## 2022-11-04 DIAGNOSIS — E78.2 MIXED HYPERLIPIDEMIA: ICD-10-CM

## 2022-11-04 DIAGNOSIS — F33.1 MODERATE EPISODE OF RECURRENT MAJOR DEPRESSIVE DISORDER (HCC): ICD-10-CM

## 2022-11-04 DIAGNOSIS — Z79.4 TYPE 2 DIABETES MELLITUS WITH HYPERGLYCEMIA, WITH LONG-TERM CURRENT USE OF INSULIN (HCC): Primary | ICD-10-CM

## 2022-11-04 DIAGNOSIS — Z11.59 ENCOUNTER FOR HEPATITIS C SCREENING TEST FOR LOW RISK PATIENT: ICD-10-CM

## 2022-11-04 DIAGNOSIS — Z79.4 TYPE 2 DIABETES MELLITUS WITH HYPERGLYCEMIA, WITH LONG-TERM CURRENT USE OF INSULIN (HCC): ICD-10-CM

## 2022-11-04 LAB
ALBUMIN SERPL-MCNC: 3.9 G/DL (ref 3.5–5.2)
ALP BLD-CCNC: 86 U/L (ref 35–104)
ALT SERPL-CCNC: 16 U/L (ref 0–32)
ANION GAP SERPL CALCULATED.3IONS-SCNC: 14 MMOL/L (ref 7–16)
AST SERPL-CCNC: 18 U/L (ref 0–31)
BASOPHILS ABSOLUTE: 0.03 E9/L (ref 0–0.2)
BASOPHILS RELATIVE PERCENT: 0.4 % (ref 0–2)
BILIRUB SERPL-MCNC: 0.5 MG/DL (ref 0–1.2)
BUN BLDV-MCNC: 18 MG/DL (ref 6–23)
CALCIUM SERPL-MCNC: 9.2 MG/DL (ref 8.6–10.2)
CHLORIDE BLD-SCNC: 101 MMOL/L (ref 98–107)
CHOLESTEROL, FASTING: 152 MG/DL (ref 0–199)
CO2: 25 MMOL/L (ref 22–29)
CREAT SERPL-MCNC: 1 MG/DL (ref 0.5–1)
CREATININE URINE: 58 MG/DL (ref 29–226)
EOSINOPHILS ABSOLUTE: 0.09 E9/L (ref 0.05–0.5)
EOSINOPHILS RELATIVE PERCENT: 1.2 % (ref 0–6)
GFR SERPL CREATININE-BSD FRML MDRD: >60 ML/MIN/1.73
GLUCOSE BLD-MCNC: 175 MG/DL (ref 74–99)
HBA1C MFR BLD: 7.9 % (ref 4–5.6)
HCT VFR BLD CALC: 40.5 % (ref 34–48)
HDLC SERPL-MCNC: 49 MG/DL
HEMOGLOBIN: 11.8 G/DL (ref 11.5–15.5)
IMMATURE GRANULOCYTES #: 0.03 E9/L
IMMATURE GRANULOCYTES %: 0.4 % (ref 0–5)
LDL CHOLESTEROL CALCULATED: 77 MG/DL (ref 0–99)
LYMPHOCYTES ABSOLUTE: 1.4 E9/L (ref 1.5–4)
LYMPHOCYTES RELATIVE PERCENT: 18.3 % (ref 20–42)
MCH RBC QN AUTO: 26.9 PG (ref 26–35)
MCHC RBC AUTO-ENTMCNC: 29.1 % (ref 32–34.5)
MCV RBC AUTO: 92.5 FL (ref 80–99.9)
MICROALBUMIN UR-MCNC: 18 MG/L
MICROALBUMIN/CREAT UR-RTO: 31 (ref 0–30)
MONOCYTES ABSOLUTE: 0.37 E9/L (ref 0.1–0.95)
MONOCYTES RELATIVE PERCENT: 4.8 % (ref 2–12)
NEUTROPHILS ABSOLUTE: 5.73 E9/L (ref 1.8–7.3)
NEUTROPHILS RELATIVE PERCENT: 74.9 % (ref 43–80)
PDW BLD-RTO: 15.4 FL (ref 11.5–15)
PLATELET # BLD: 193 E9/L (ref 130–450)
PMV BLD AUTO: 12.8 FL (ref 7–12)
POTASSIUM SERPL-SCNC: 4.8 MMOL/L (ref 3.5–5)
RBC # BLD: 4.38 E12/L (ref 3.5–5.5)
SODIUM BLD-SCNC: 140 MMOL/L (ref 132–146)
T4 FREE: 1.76 NG/DL (ref 0.93–1.7)
TOTAL PROTEIN: 6.8 G/DL (ref 6.4–8.3)
TRIGLYCERIDE, FASTING: 129 MG/DL (ref 0–149)
TSH SERPL DL<=0.05 MIU/L-ACNC: 3.41 UIU/ML (ref 0.27–4.2)
VLDLC SERPL CALC-MCNC: 26 MG/DL
WBC # BLD: 7.7 E9/L (ref 4.5–11.5)

## 2022-11-04 PROCEDURE — 3017F COLORECTAL CA SCREEN DOC REV: CPT | Performed by: INTERNAL MEDICINE

## 2022-11-04 PROCEDURE — G0472 HEP C SCREEN HIGH RISK/OTHER: HCPCS | Performed by: INTERNAL MEDICINE

## 2022-11-04 PROCEDURE — 99214 OFFICE O/P EST MOD 30 MIN: CPT | Performed by: INTERNAL MEDICINE

## 2022-11-04 PROCEDURE — 3078F DIAST BP <80 MM HG: CPT | Performed by: INTERNAL MEDICINE

## 2022-11-04 PROCEDURE — G0008 ADMIN INFLUENZA VIRUS VAC: HCPCS | Performed by: INTERNAL MEDICINE

## 2022-11-04 PROCEDURE — G8417 CALC BMI ABV UP PARAM F/U: HCPCS | Performed by: INTERNAL MEDICINE

## 2022-11-04 PROCEDURE — 1036F TOBACCO NON-USER: CPT | Performed by: INTERNAL MEDICINE

## 2022-11-04 PROCEDURE — 3074F SYST BP LT 130 MM HG: CPT | Performed by: INTERNAL MEDICINE

## 2022-11-04 PROCEDURE — G8427 DOCREV CUR MEDS BY ELIG CLIN: HCPCS | Performed by: INTERNAL MEDICINE

## 2022-11-04 PROCEDURE — 90674 CCIIV4 VAC NO PRSV 0.5 ML IM: CPT | Performed by: INTERNAL MEDICINE

## 2022-11-04 PROCEDURE — G8482 FLU IMMUNIZE ORDER/ADMIN: HCPCS | Performed by: INTERNAL MEDICINE

## 2022-11-04 PROCEDURE — 2022F DILAT RTA XM EVC RTNOPTHY: CPT | Performed by: INTERNAL MEDICINE

## 2022-11-04 PROCEDURE — 3046F HEMOGLOBIN A1C LEVEL >9.0%: CPT | Performed by: INTERNAL MEDICINE

## 2022-11-04 RX ORDER — MONTELUKAST SODIUM 10 MG/1
10 TABLET ORAL DAILY
Qty: 30 TABLET | Refills: 10 | Status: SHIPPED | OUTPATIENT
Start: 2022-11-04

## 2022-11-04 RX ORDER — BUPROPION HYDROCHLORIDE 150 MG/1
150 TABLET ORAL EVERY MORNING
Qty: 30 TABLET | Refills: 3 | Status: SHIPPED | OUTPATIENT
Start: 2022-11-04

## 2022-11-04 SDOH — ECONOMIC STABILITY: FOOD INSECURITY: WITHIN THE PAST 12 MONTHS, YOU WORRIED THAT YOUR FOOD WOULD RUN OUT BEFORE YOU GOT MONEY TO BUY MORE.: NEVER TRUE

## 2022-11-04 SDOH — ECONOMIC STABILITY: FOOD INSECURITY: WITHIN THE PAST 12 MONTHS, THE FOOD YOU BOUGHT JUST DIDN'T LAST AND YOU DIDN'T HAVE MONEY TO GET MORE.: NEVER TRUE

## 2022-11-04 ASSESSMENT — PATIENT HEALTH QUESTIONNAIRE - PHQ9
SUM OF ALL RESPONSES TO PHQ QUESTIONS 1-9: 2
1. LITTLE INTEREST OR PLEASURE IN DOING THINGS: 1
SUM OF ALL RESPONSES TO PHQ QUESTIONS 1-9: 2
SUM OF ALL RESPONSES TO PHQ9 QUESTIONS 1 & 2: 2
2. FEELING DOWN, DEPRESSED OR HOPELESS: 1
SUM OF ALL RESPONSES TO PHQ QUESTIONS 1-9: 2
SUM OF ALL RESPONSES TO PHQ QUESTIONS 1-9: 2

## 2022-11-04 ASSESSMENT — SOCIAL DETERMINANTS OF HEALTH (SDOH): HOW HARD IS IT FOR YOU TO PAY FOR THE VERY BASICS LIKE FOOD, HOUSING, MEDICAL CARE, AND HEATING?: HARD

## 2022-11-04 NOTE — PROGRESS NOTES
Eli Holden (:  1960) is a 58 y.o. female,Established patient, here for evaluation of the following chief complaint(s):  Check-Up      Subjective   SUBJECTIVE/OBJECTIVE:  HPI  DM2:   Patient is here to fu regarding DM2. Patient is  controlled. Taking all medications and tolerating well. Currently on insulin. Has not been checking her blood sugar. Patient reported that she is depressed make her not to take care of herself she lost her brother that was very close to her patient is taking ASA . No hypoglycemic episodes. Has not had an eye exam for many years. Informed patient that it is necessary to see an Eye Dr yearly. Patient does not smoke. Most recent labs reviewed with patient. Patient does not have complaints or concerns today. Did not do blood work  Hyperlipidemia:  Patient is here to follow up regarding  hyperlipidemia. This is  generally controlled. Not currently on any cholesterol medicine patient is  compliant with lifestyle modifications. Patient is not a smoker. Most recent labs reviewed with patient today and are not remarkable. Comorbid conditions include obesity. Hypertension:  Patient is here for follow up chronic hypertension. This is  generally controlled on current medication regimen. BP Readings from Last 1 Encounters:   22 128/70        Takes meds as directed and tolerates them well. Most recent labs reviewed with patient and are not remarkable. No symptoms from htn standpoint per ROS. Patient is  compliant with lifestyle modifications. Patient does not smoke. Comorbid conditions include obesity  Depression:  Patient is here with complaints of depression. This is a chronic problem. This has been going on for years. Progressively worse because of loss of her brother. Patient is crying. Not suicidal or homicidal exacerbating factors include stress. Taking Venlafaxine and is not helping enough depressive symptoms include none at this visit.   Patient does not have suicidal or homicidal ideation. Patient is not having issues falling or staying asleep. Patient is not having associated panic attacks. The above is not interfering with quality of life. Patient does not use alcohol and/or drugs. Hypothyroidism:  Patient is here today to follow up chronic hypothyroidism. This is   generally controlled on current medication regimen. Takes medication as directed and tolerates well. Symptoms from thyroid standpoint include none. Most recent labs reviewed with patient and are not remarkable. Thyroid function in particular is  controlled. Review of Systems   All other systems reviewed and are negative.            Objective   /70   Pulse 68   Temp 97.7 °F (36.5 °C) (Temporal)   Ht 5' 4\" (1.626 m)   Wt (!) 437 lb 1.6 oz (198.3 kg)   SpO2 95%   BMI 75.03 kg/m²   Current Outpatient Medications   Medication Sig Dispense Refill    buPROPion (WELLBUTRIN XL) 150 MG extended release tablet Take 1 tablet by mouth every morning 30 tablet 3    Alcohol Swabs (EASY TOUCH ALCOHOL PREP MEDIUM) 70 % PADS USE TO CLEAN THE SPOT BEFORE INJECTIONS OR BLOOD SUGAR CHECKS      amLODIPine (NORVASC) 5 MG tablet       furosemide (LASIX) 20 MG tablet       ONETOUCH ULTRA strip USE TO TEST BLOOD SUGAR 3 TIMES DAILY      ONE TOUCH ULTRASOFT LANCETS MISC       levothyroxine (SYNTHROID) 88 MCG tablet       losartan (COZAAR) 100 MG tablet       venlafaxine (EFFEXOR) 75 MG tablet Take 1 tablet by mouth daily 90 tablet 0    montelukast (SINGULAIR) 10 MG tablet Take 1 tablet by mouth daily 90 tablet 0    BASAGLAR KWIKPEN 100 UNIT/ML injection pen INJECT 32 UNITS SUBCUTANEOUSLY AND INTO THE SKIN IN THE MORNING AND 32 UNITS BEFORE BEDTIME 15 mL 10    vitamin D (ERGOCALCIFEROL) 35209 units CAPS capsule Take 50,000 Units by mouth once a week      insulin aspart (NOVOLOG) 100 UNIT/ML injection vial Inject into the skin 3 times daily (before meals)      acetaminophen (TYLENOL) 500 MG tablet Take 500 mg by mouth every 6 hours as needed for Pain      fluticasone (FLONASE) 50 MCG/ACT nasal spray 1 spray by Nasal route as needed       Insulin Degludec 100 UNIT/ML SOPN Inject 46 Units into the skin Daily (Patient not taking: Reported on 11/4/2022)      losartan (COZAAR) 25 MG tablet Take 50 mg by mouth daily  (Patient not taking: Reported on 11/4/2022)       No current facility-administered medications for this visit.       Allergies   Allergen Reactions    Asa [Aspirin]      sensitivity    Codeine      Vomiting not sure if caused by codeine    Other      Sutures   Body rejecting sutures-- incisions open up    Tape [Adhesive Tape] Other (See Comments)     Peels skin off        Past Medical History:   Diagnosis Date    Arthritis     hands    History of blood transfusion     Hypertension     no meds    Hypothyroidism     Sleep apnea     Surgical wound, non healing     Type 2 diabetes mellitus without complication (HCC)     Type II or unspecified type diabetes mellitus without mention of complication, not stated as uncontrolled     diet controlled     Past Surgical History:   Procedure Laterality Date    ABDOMEN SURGERY  10/23/2015     excisional debridement abdominal wound    APPENDECTOMY      CARDIAC SURGERY      negative    CHOLECYSTECTOMY      COLON SURGERY      intestinal, stomach (part)    OTHER SURGICAL HISTORY  4/6/16    abd wound debridement    NM EXC SKIN BENIG <5MM TRUNK,ARM,LEG N/A 5/2/2018    EXCISION ABDOMINAL WALL WOUND performed by Alina Segura MD at [address] [facility]       Family History   Problem Relation Age of Onset    Heart Disease Mother     Heart Attack Mother     Cancer Father       Social History     Socioeconomic History    Marital status:      Spouse name: Not on file    Number of children: Not on file    Years of education: Not on file    Highest education level: Not on file   Occupational History    Not on file   Tobacco Use    Smoking status: Former     Packs/day: 1.00     Years: 6.00     Pack years: 6.00     Types: Cigarettes     Quit date:      Years since quittin.8    Smokeless tobacco: Never    Tobacco comments:     quit smoking 17 yrs ago   Substance and Sexual Activity    Alcohol use: No     Comment: rarely    Drug use: No    Sexual activity: Never   Other Topics Concern    Not on file   Social History Narrative    Not on file     Social Determinants of Health     Financial Resource Strain: High Risk    Difficulty of Paying Living Expenses: Hard   Food Insecurity: No Food Insecurity    Worried About Running Out of Food in the Last Year: Never true    Ran Out of Food in the Last Year: Never true   Transportation Needs: Not on file   Physical Activity: Not on file   Stress: Not on file   Social Connections: Not on file   Intimate Partner Violence: Not on file   Housing Stability: Not on file      Health Maintenance Due   Topic Date Due    COVID-19 Vaccine (1) Never done    Diabetic foot exam  Never done    Depression Screen  Never done    HIV screen  Never done    Diabetic retinal exam  Never done    Hepatitis C screen  Never done    DTaP/Tdap/Td vaccine (1 - Tdap) Never done    Cervical cancer screen  Never done    Breast cancer screen  Never done    Shingles vaccine (1 of 2) Never done    Annual Wellness Visit (AWV)  Never done    A1C test (Diabetic or Prediabetic)  2022    lski    Physical Exam  Constitutional:       General: She is not in acute distress. Appearance: Normal appearance. She is obese. HENT:      Head: Normocephalic and atraumatic. Right Ear: Tympanic membrane, ear canal and external ear normal.      Left Ear: Tympanic membrane, ear canal and external ear normal. There is no impacted cerumen. Nose: Nose normal. No congestion or rhinorrhea. Mouth/Throat:      Mouth: Mucous membranes are moist.      Pharynx: Oropharynx is clear.  No oropharyngeal exudate or posterior oropharyngeal erythema. Eyes:      General: No scleral icterus. Right eye: No discharge. Left eye: No discharge. Pupils: Pupils are equal, round, and reactive to light. Neck:      Vascular: No carotid bruit. Cardiovascular:      Rate and Rhythm: Normal rate and regular rhythm. Pulses: Normal pulses. Heart sounds: No murmur heard. No gallop. Pulmonary:      Effort: Pulmonary effort is normal. No respiratory distress. Breath sounds: Normal breath sounds. No wheezing, rhonchi or rales. Chest:      Chest wall: No tenderness. Abdominal:      General: Bowel sounds are normal.      Palpations: Abdomen is soft. There is no mass. Tenderness: There is no abdominal tenderness. There is no guarding. Hernia: No hernia is present. Musculoskeletal:         General: No swelling, tenderness, deformity or signs of injury. Cervical back: Normal range of motion and neck supple. No rigidity or tenderness. Right lower leg: No edema. Left lower leg: No edema. Lymphadenopathy:      Cervical: No cervical adenopathy. Skin:     General: Skin is warm and dry. Capillary Refill: Capillary refill takes 2 to 3 seconds. Findings: No bruising, lesion or rash. Neurological:      General: No focal deficit present. Mental Status: She is alert and oriented to person, place, and time. Sensory: No sensory deficit. Motor: No weakness. Gait: Gait normal.   Psychiatric:         Mood and Affect: Mood normal.         Behavior: Behavior normal.         Thought Content: Thought content normal.         Judgment: Judgment normal.             ASSESSMENT/PLAN:  1. Type 2 diabetes mellitus with hyperglycemia, with long-term current use of insulin ([facility])  -     Hemoglobin A1C; Future  -     Microalbumin / Creatinine Urine Ratio; Future  -     External Referral To Ophthalmology  2. Diabetes mellitus type 2 in nonobese ([facility])  3.  Body mass index (BMI) 70 or greater, adult ([facility]  4. Mixed hyperlipidemia  -     Lipid, Fasting; Future  5. Essential (primary) hypertension  -     CBC with Auto Differential; Future  -     Comprehensive Metabolic Panel; Future  6. Hyperthyroidism  -     T4, Free; Future  -     TSH; Future  7. Moderate episode of recurrent major depressive disorder (HCC)  -     buPROPion (WELLBUTRIN XL) 150 MG extended release tablet; Take 1 tablet by mouth every morning, Disp-30 tablet, R-3Normal  8. Muscle cramps  9. Diabetic polyneuropathy associated with type 2 diabetes mellitus ([address]rigan DPM, Podiatry, [place]  10. Encounter for hepatitis C screening test for low risk patient  -     [place] HEP C SCREEN HIGH RISK/OTHER    No follow-ups on file. An electronic signature was used to authenticate this note.     --Stanton Moses MD

## 2022-11-04 NOTE — PATIENT INSTRUCTIONS
Vaccine Information Statement    Influenza (Flu) Vaccine (Inactivated or Recombinant): What You Need to Know    Many vaccine information statements are available in Georgian and other languages. See www.immunize.org/vis. Hojas de información sobre vacunas están disponibles en español y en muchos otros idiomas. Visite www.immunize.org/vis. 1. Why get vaccinated? Influenza vaccine can prevent influenza (flu). Flu is a contagious disease that spreads around the [facility] every year, usually between October and May. Anyone can get the flu, but it is more dangerous for some people. Infants and young children, people 72 years and older, pregnant people, and people with certain health conditions or a weakened immune system are at greatest risk of flu complications. Pneumonia, bronchitis, sinus infections, and ear infections are examples of flu-related complications. If you have a medical condition, such as heart disease, cancer, or diabetes, flu can make it worse. Flu can cause fever and chills, sore throat, muscle aches, fatigue, cough, headache, and runny or stuffy nose. Some people may have vomiting and diarrhea, though this is more common in children than adults. In an average year, thousands of people in the [facility] die from flu, and many more are hospitalized. Flu vaccine prevents millions of illnesses and flu-related visits to the doctor each year. 2. Influenza vaccines     CDC recommends everyone 6 months and older get vaccinated every flu season. Children 6 months through 6years of age may need 2 doses during a single flu season. Everyone else needs only 1 dose each flu season. It takes about 2 weeks for protection to develop after vaccination. There are many flu viruses, and they are always changing. Each year a new flu vaccine is made to protect against the influenza viruses believed to be likely to cause disease in the upcoming flu season.  Even when the vaccine doesn't exactly match these viruses, it may still provide some protection. Influenza vaccine does not cause flu. Influenza vaccine may be given at the same time as other vaccines. 3. Talk with your health care provider    Tell your vaccination provider if the person getting the vaccine:  o Has had an allergic reaction after a previous dose of influenza vaccine, or has any severe, life-threatening allergies   o Has ever had Guillain-Barré Syndrome (also called \"GBS\")    In some cases, your health care provider may decide to postpone influenza vaccination until a future visit. Influenza vaccine can be administered at any time during pregnancy. People who are or will be pregnant during influenza season should receive inactivated influenza vaccine. People with minor illnesses, such as a cold, may be vaccinated. People who are moderately or severely ill should usually wait until they recover before getting influenza vaccine. Your health care provider can give you more information. 4. Risks of a vaccine reaction    o Soreness, redness, and swelling where the shot is given, fever, muscle aches, and headache can happen after influenza vaccination. o There may be a very small increased risk of Guillain-Barré Syndrome (GBS) after inactivated influenza vaccine (the flu shot). [facility] children who get the flu shot along with pneumococcal vaccine (PCV13) and/or DTaP vaccine at the same time might be slightly more likely to have a seizure caused by fever. Tell your health care provider if a child who is getting flu vaccine has ever had a seizure. People sometimes faint after medical procedures, including vaccination. Tell your provider if you feel dizzy or have vision changes or ringing in the ears. As with any medicine, there is a very remote chance of a vaccine causing a severe allergic reaction, other serious injury, or death. 5. What if there is a serious problem?     An allergic reaction could occur after the vaccinated person leaves the clinic. If you see signs of a severe allergic reaction (hives, swelling of the face and throat, difficulty breathing, a fast heartbeat, dizziness, or weakness), call 9-1-1 and get the person to the nearest hospital.    For other signs that concern you, call your health care provider. Adverse reactions should be reported to the Vaccine Adverse Event Reporting System (VAERS). Your health care provider will usually file this report, or you can do it yourself. Visit the VAERS website at www.vaers. [facility].gov or call 0-484.206.5440. VAERS is only for reporting reactions, and VAERS staff members do not give medical advice. 6. The National Vaccine Injury Compensation Program    The [facility] Vaccine Injury Compensation Program (VICP) is a federal program that was created to compensate people who may have been injured by certain vaccines. Claims regarding alleged injury or death due to vaccination have a time limit for filing, which may be as short as two years. Visit the VICP website at www.[facility] [[facility]]a.gov/vaccinecompensation or call 1-536.309.1259 to learn about the program and about filing a claim. 7. How can I learn more?    o Ask your health care provider. o Call your local or state health department. o Visit the website of the Food and Drug Administration (FDA) for vaccine package inserts and additional information at www.fda.gov/vaccines-blood-biologics/vaccines. o Contact the Centers for Disease Control and Prevention (CDC):  - Call 4-554.902.1153 (1-800-CDC-INFO) or  - Visit CDC's influenza website at www.cdc.gov/flu. Vaccine Information Statement   Inactivated Influenza Vaccine   8/6/2021  [address] 101GA-14     Department of Health and Human Services  Centers for Disease Control and Prevention

## 2022-11-06 RX ORDER — SITAGLIPTIN 100 MG/1
100 TABLET, FILM COATED ORAL DAILY
Qty: 30 TABLET | Refills: 3
Start: 2022-11-06 | End: 2022-11-07 | Stop reason: SDUPTHER

## 2022-11-06 NOTE — RESULT ENCOUNTER NOTE
Call patient blood sugar is high and Januvia 100 mg daily low carbohydrate diet recheck blood work in 3 months

## 2022-11-07 DIAGNOSIS — E11.65 TYPE 2 DIABETES MELLITUS WITH HYPERGLYCEMIA, WITH LONG-TERM CURRENT USE OF INSULIN (HCC): Primary | ICD-10-CM

## 2022-11-07 DIAGNOSIS — Z79.4 TYPE 2 DIABETES MELLITUS WITH HYPERGLYCEMIA, WITH LONG-TERM CURRENT USE OF INSULIN (HCC): Primary | ICD-10-CM

## 2022-11-07 RX ORDER — SITAGLIPTIN 100 MG/1
100 TABLET, FILM COATED ORAL DAILY
Qty: 90 TABLET | Refills: 1 | Status: SHIPPED | OUTPATIENT
Start: 2022-11-07

## 2022-11-14 RX ORDER — VENLAFAXINE 75 MG/1
75 TABLET ORAL DAILY
Qty: 90 TABLET | Refills: 1 | Status: SHIPPED | OUTPATIENT
Start: 2022-11-14

## 2022-11-15 RX ORDER — VENLAFAXINE 75 MG/1
75 TABLET ORAL DAILY
Qty: 30 TABLET | Refills: 10 | OUTPATIENT
Start: 2022-11-15

## 2022-11-30 RX ORDER — LEVOTHYROXINE SODIUM 88 UG/1
88 TABLET ORAL DAILY
Qty: 90 TABLET | Refills: 0 | Status: SHIPPED | OUTPATIENT
Start: 2022-11-30 | End: 2023-02-28

## 2022-12-13 ENCOUNTER — OFFICE VISIT (OUTPATIENT)
Dept: PODIATRY | Age: 62
End: 2022-12-13
Payer: MEDICARE

## 2022-12-13 VITALS — BODY MASS INDEX: 50.02 KG/M2 | HEIGHT: 64 IN | WEIGHT: 293 LBS

## 2022-12-13 DIAGNOSIS — R20.2 PARESTHESIA OF BOTH LOWER EXTREMITIES: ICD-10-CM

## 2022-12-13 DIAGNOSIS — R26.2 DIFFICULTY WALKING: ICD-10-CM

## 2022-12-13 DIAGNOSIS — E11.42 DIABETIC POLYNEUROPATHY ASSOCIATED WITH TYPE 2 DIABETES MELLITUS (HCC): Primary | ICD-10-CM

## 2022-12-13 PROCEDURE — G8482 FLU IMMUNIZE ORDER/ADMIN: HCPCS | Performed by: PODIATRIST

## 2022-12-13 PROCEDURE — 3051F HG A1C>EQUAL 7.0%<8.0%: CPT | Performed by: PODIATRIST

## 2022-12-13 PROCEDURE — 2022F DILAT RTA XM EVC RTNOPTHY: CPT | Performed by: PODIATRIST

## 2022-12-13 PROCEDURE — 1036F TOBACCO NON-USER: CPT | Performed by: PODIATRIST

## 2022-12-13 PROCEDURE — 99203 OFFICE O/P NEW LOW 30 MIN: CPT | Performed by: PODIATRIST

## 2022-12-13 PROCEDURE — G8417 CALC BMI ABV UP PARAM F/U: HCPCS | Performed by: PODIATRIST

## 2022-12-13 PROCEDURE — G8427 DOCREV CUR MEDS BY ELIG CLIN: HCPCS | Performed by: PODIATRIST

## 2022-12-13 PROCEDURE — 3017F COLORECTAL CA SCREEN DOC REV: CPT | Performed by: PODIATRIST

## 2022-12-13 RX ORDER — LEUCOVORIN/PYRIDOX/MECOBALAMIN 4-50-2 MG
4-50 TABLET ORAL 2 TIMES DAILY
Qty: 90 TABLET | Refills: 2 | Status: SHIPPED | OUTPATIENT
Start: 2022-12-13

## 2022-12-13 NOTE — LETTER
[address] [place] [address]  Phone: [phone]  Fax: [phone]    Melissa Del Angel  [id number]   1960 12/13/2022      Dear Dr. Radha Chaves,    I would like to thank you for the kind referral of Nimisha Del Angel. She presented to the office today for evaluation regarding chronic diabetic neuropathy issues. Discussed vitamin supplementation for underlying paresthesias. We will have continued diabetic foot evaluation and care in the future. If you should have any questions concerning her visit today, please do not hesitate to contact me.     Sincerely,    Tonny Carlson DPM

## 2022-12-13 NOTE — PROGRESS NOTES
Patient is in today for evaluation of the diabetic foot. Patient says she has neuropathy pain.  Pcp is Duglas Mota MD  Last ov 11/4/22

## 2022-12-14 NOTE — PROGRESS NOTES
22     Trev Starks    : 1960 Sex: female   Age: 58 y.o. Patient was referred by: Payton Arceo MD  Patient's PCP/Provider is:  Payton Arceo MD    Subjective:    Patient is seen today for evaluation regarding neuropathy issues to both lower extremities. Chief Complaint   Patient presents with    Diabetes     Foot exam        HPI: Patient has been on other medications with minimal improvement noted to both lower extremities. She does experience most of her issues at night which cause issues with sleeping. She wanted to discuss additional options available for care. No other issues noted at this time. ROS:  Const: Positives and pertinent negatives as per HPI. Musculo: Denies symptoms other than stated above. Neuro: Denies symptoms other than stated above. Skin: Denies symptoms other than stated above.     Current Medications:    Current Outpatient Medications:     Folinic Acid-Vit B6-Vit B12 (FOLINIC-PLUS) 4-50-2 MG TABS, Take 4-50 mg by mouth 2 times daily, Disp: 90 tablet, Rfl: 2    levothyroxine (SYNTHROID) 88 MCG tablet, Take 1 tablet by mouth Daily, Disp: 90 tablet, Rfl: 0    venlafaxine (EFFEXOR) 75 MG tablet, TAKE 1 TABLET BY MOUTH DAILY, Disp: 90 tablet, Rfl: 1    montelukast (SINGULAIR) 10 MG tablet, TAKE 1 TABLET BY MOUTH DAILY, Disp: 30 tablet, Rfl: 10    Alcohol Swabs (EASY TOUCH ALCOHOL PREP MEDIUM) 70 % PADS, USE TO CLEAN THE SPOT BEFORE INJECTIONS OR BLOOD SUGAR CHECKS, Disp: , Rfl:     amLODIPine (NORVASC) 5 MG tablet, , Disp: , Rfl:     furosemide (LASIX) 20 MG tablet, , Disp: , Rfl:     ONETOUCH ULTRA strip, USE TO TEST BLOOD SUGAR 3 TIMES DAILY, Disp: , Rfl:     ONE TOUCH ULTRASOFT LANCETS MISC, , Disp: , Rfl:     losartan (COZAAR) 100 MG tablet, Take 100 mg by mouth daily, Disp: , Rfl:     BASAGLAR KWIKPEN 100 UNIT/ML injection pen, INJECT 32 UNITS SUBCUTANEOUSLY AND INTO THE SKIN IN THE MORNING AND 32 UNITS BEFORE BEDTIME (Patient taking differently: Inject 35 Units into the skin 2 times daily), Disp: 15 mL, Rfl: 10    vitamin D (ERGOCALCIFEROL) 93141 units CAPS capsule, Take 50,000 Units by mouth once a week, Disp: , Rfl:     insulin aspart (NOVOLOG) 100 UNIT/ML injection vial, Inject into the skin 3 times daily (before meals), Disp: , Rfl:     acetaminophen (TYLENOL) 500 MG tablet, Take 500 mg by mouth every 6 hours as needed for Pain, Disp: , Rfl:     fluticasone (FLONASE) 50 MCG/ACT nasal spray, 1 spray by Nasal route as needed , Disp: , Rfl:     JANUVIA 100 MG tablet, Take 1 tablet by mouth daily (Patient not taking: Reported on 12/13/2022), Disp: 90 tablet, Rfl: 1    buPROPion (WELLBUTRIN XL) 150 MG extended release tablet, Take 1 tablet by mouth every morning (Patient not taking: Reported on 12/13/2022), Disp: 30 tablet, Rfl: 3    Allergies:   Allergies   Allergen Reactions    Asa [Aspirin]      sensitivity    Codeine      Vomiting not sure if caused by codeine    Other      Sutures   Body rejecting sutures-- incisions open up    Tape [Adhesive Tape] Other (See Comments)     Peels skin off       Vitals:    12/13/22 1044   Weight: (!) 437 lb (198.2 kg)   Height: 5' 4\" (1.626 m)        Past Medical History:   Diagnosis Date    Arthritis     hands    History of blood transfusion     Hypertension     no meds    Hypothyroidism     Sleep apnea     Surgical wound, non healing     Type 2 diabetes mellitus without complication (HCC)     Type II or unspecified type diabetes mellitus without mention of complication, not stated as uncontrolled     diet controlled     Family History   Problem Relation Age of Onset    Heart Disease Mother     Heart Attack Mother     Cancer Father      Past Surgical History:   Procedure Laterality Date    ABDOMEN SURGERY  10/23/2015     excisional debridement abdominal wound    APPENDECTOMY      CARDIAC SURGERY      negative    CHOLECYSTECTOMY      COLON SURGERY      intestinal, stomach (part)    OTHER SURGICAL HISTORY  4/6/16    abd wound debridement    MA EXC SKIN BENIG <5MM TRUNK,ARM,LEG N/A 2018    EXCISION ABDOMINAL WALL WOUND performed by Jn Woodson MD at [facility] ENDOSCOPY       Social History     Tobacco Use    Smoking status: Former     Packs/day: 1.00     Years: 6.00     Pack years: 6.00     Types: Cigarettes     Quit date:      Years since quittin.9    Smokeless tobacco: Never    Tobacco comments:     quit smoking 17 yrs ago   Substance Use Topics    Alcohol use: No     Comment: rarely    Drug use: No           Diagnostic studies:    No results found. Procedures:    None    Exam:  VASCULAR: Pedal pulses palpable bilateral foot. CFT < 5 seconds digits 1-5 bilateral foot. NEUROLOGICAL: Paresthesias noted to both lower extremities. DERMATOLOGICAL: Venous insufficiency issues noted to both lower extremities. No open ulcerations noted bilateral lower extremities. MUSCULOSKELETAL: Limited ROM noted to both lower extremities. Plan Per Assessment  Nimisha was seen today for diabetes. Diagnoses and all orders for this visit:    Diabetic polyneuropathy associated with type 2 diabetes mellitus (HCC)  -     Folinic Acid-Vit B6-Vit B12 (FOLINIC-PLUS) 4-50-2 MG TABS; Take 4-50 mg by mouth 2 times daily    Paresthesia of both lower extremities  -     Folinic Acid-Vit B6-Vit B12 (FOLINIC-PLUS) 4-50-2 MG TABS; Take 4-50 mg by mouth 2 times daily    Difficulty walking        New patient evaluation and management  Discussed diabetic foot options with patient today. Rx given today for oral vitamin supplementation regarding the neuropathy issues. Patient will be followed up for continued evaluation and management      Seen By:    Tamiko Valdes DPM    Electronically signed by Tamiko Valdes DPM on 2022 at 8:04 PM      This note was created using voice recognition software. The note was reviewed however may contain grammatical errors.

## 2023-01-16 RX ORDER — LEVOTHYROXINE SODIUM 88 UG/1
88 TABLET ORAL DAILY
Qty: 90 TABLET | Refills: 1 | Status: SHIPPED | OUTPATIENT
Start: 2023-01-16 | End: 2023-04-16

## 2023-02-07 ENCOUNTER — TELEPHONE (OUTPATIENT)
Dept: PRIMARY CARE CLINIC | Age: 63
End: 2023-02-07

## 2023-02-07 NOTE — TELEPHONE ENCOUNTER
Called patient to reschedule appointment.  She is unable to schedule at present time D/T daughters car in the shop  Will call office to schedule

## 2023-03-24 RX ORDER — LOSARTAN POTASSIUM 100 MG/1
100 TABLET ORAL DAILY
Qty: 90 TABLET | Refills: 0 | Status: SHIPPED | OUTPATIENT
Start: 2023-03-24

## 2023-03-24 RX ORDER — FUROSEMIDE 20 MG/1
20 TABLET ORAL DAILY
Qty: 90 TABLET | Refills: 0 | Status: SHIPPED | OUTPATIENT
Start: 2023-03-24

## 2023-03-27 RX ORDER — ISOPROPYL ALCOHOL 70 ML/100ML
SWAB TOPICAL
Qty: 200 EACH | Refills: 1 | Status: SHIPPED | OUTPATIENT
Start: 2023-03-27

## 2023-03-27 RX ORDER — INSULIN ASPART 100 [IU]/ML
INJECTION, SOLUTION INTRAVENOUS; SUBCUTANEOUS
Qty: 5 ADJUSTABLE DOSE PRE-FILLED PEN SYRINGE | Refills: 3 | Status: SHIPPED | OUTPATIENT
Start: 2023-03-27

## 2023-04-18 ASSESSMENT — ENCOUNTER SYMPTOMS
APNEA: 0
SINUS PAIN: 0
CONSTIPATION: 0
WHEEZING: 0
DIARRHEA: 0
SORE THROAT: 0
BLOOD IN STOOL: 0
SHORTNESS OF BREATH: 0
PHOTOPHOBIA: 0
COUGH: 0
EYE ITCHING: 0
NAUSEA: 0
EYE DISCHARGE: 0
VOMITING: 0
ABDOMINAL DISTENTION: 0
TROUBLE SWALLOWING: 0
ABDOMINAL PAIN: 0
BACK PAIN: 0

## 2023-04-18 NOTE — PROGRESS NOTES
Mood and Affect: Mood normal.         Behavior: Behavior normal.         Thought Content: Thought content normal.         Judgment: Judgment normal.             ASSESSMENT/PLAN:  1. Essential (primary) hypertension  2. Diabetic polyneuropathy associated with type 2 diabetes mellitus ([facility]  -     Amb External Referral To Ophthalmology  -     Hemoglobin A1C; Future  3. Body mass index (BMI) 70 or greater, adult [facility])  -     Hernandez Ortiz MD, General Surgery, [facility]  4. Mixed hyperlipidemia  -     Comprehensive Metabolic Panel, Fasting; Future  -     LIPID PANEL; Future  5. Moderate episode of recurrent major depressive disorder ([facility]  6. Encounter for screening mammogram for malignant neoplasm of breast  -     NAYELI SHLOMO DIGITAL SCREEN BILATERAL; Future  7. Screening due  -     Hepatitis C Antibody; Future  8. Acquired hypothyroidism  -     TSH; Future  -     T4, Free; Future      No follow-ups on file. An electronic signature was used to authenticate this note.     --Abby Doll MD

## 2023-04-20 ENCOUNTER — OFFICE VISIT (OUTPATIENT)
Dept: PRIMARY CARE CLINIC | Age: 63
End: 2023-04-20
Payer: MEDICARE

## 2023-04-20 VITALS
WEIGHT: 293 LBS | HEIGHT: 64 IN | DIASTOLIC BLOOD PRESSURE: 70 MMHG | HEART RATE: 86 BPM | OXYGEN SATURATION: 95 % | SYSTOLIC BLOOD PRESSURE: 130 MMHG | TEMPERATURE: 98.6 F | BODY MASS INDEX: 50.02 KG/M2

## 2023-04-20 DIAGNOSIS — I10 ESSENTIAL (PRIMARY) HYPERTENSION: ICD-10-CM

## 2023-04-20 DIAGNOSIS — Z13.9 SCREENING DUE: ICD-10-CM

## 2023-04-20 DIAGNOSIS — E03.9 ACQUIRED HYPOTHYROIDISM: ICD-10-CM

## 2023-04-20 DIAGNOSIS — E11.42 DIABETIC POLYNEUROPATHY ASSOCIATED WITH TYPE 2 DIABETES MELLITUS (HCC): ICD-10-CM

## 2023-04-20 DIAGNOSIS — I10 ESSENTIAL (PRIMARY) HYPERTENSION: Primary | ICD-10-CM

## 2023-04-20 DIAGNOSIS — E78.2 MIXED HYPERLIPIDEMIA: ICD-10-CM

## 2023-04-20 DIAGNOSIS — Z79.4 TYPE 2 DIABETES MELLITUS WITH HYPERGLYCEMIA, WITH LONG-TERM CURRENT USE OF INSULIN (HCC): Primary | ICD-10-CM

## 2023-04-20 DIAGNOSIS — Z12.31 ENCOUNTER FOR SCREENING MAMMOGRAM FOR MALIGNANT NEOPLASM OF BREAST: ICD-10-CM

## 2023-04-20 DIAGNOSIS — E11.65 TYPE 2 DIABETES MELLITUS WITH HYPERGLYCEMIA, WITH LONG-TERM CURRENT USE OF INSULIN (HCC): ICD-10-CM

## 2023-04-20 DIAGNOSIS — E05.90 HYPERTHYROIDISM: ICD-10-CM

## 2023-04-20 DIAGNOSIS — E11.65 TYPE 2 DIABETES MELLITUS WITH HYPERGLYCEMIA, WITH LONG-TERM CURRENT USE OF INSULIN (HCC): Primary | ICD-10-CM

## 2023-04-20 DIAGNOSIS — F33.1 MODERATE EPISODE OF RECURRENT MAJOR DEPRESSIVE DISORDER (HCC): ICD-10-CM

## 2023-04-20 DIAGNOSIS — Z79.4 TYPE 2 DIABETES MELLITUS WITH HYPERGLYCEMIA, WITH LONG-TERM CURRENT USE OF INSULIN (HCC): ICD-10-CM

## 2023-04-20 PROBLEM — R25.2 MUSCLE CRAMPS: Status: RESOLVED | Noted: 2022-11-04 | Resolved: 2023-04-20

## 2023-04-20 LAB
ALBUMIN SERPL-MCNC: 4.1 G/DL (ref 3.5–5.2)
ALP SERPL-CCNC: 115 U/L (ref 35–104)
ALT SERPL-CCNC: 22 U/L (ref 0–32)
ANION GAP SERPL CALCULATED.3IONS-SCNC: 20 MMOL/L (ref 7–16)
AST SERPL-CCNC: 23 U/L (ref 0–31)
BASOPHILS # BLD: 0.04 E9/L (ref 0–0.2)
BASOPHILS NFR BLD: 0.4 % (ref 0–2)
BILIRUB SERPL-MCNC: 0.6 MG/DL (ref 0–1.2)
BUN SERPL-MCNC: 24 MG/DL (ref 6–23)
CALCIUM SERPL-MCNC: 9.9 MG/DL (ref 8.6–10.2)
CHLORIDE SERPL-SCNC: 95 MMOL/L (ref 98–107)
CHOLESTEROL, FASTING: 148 MG/DL (ref 0–199)
CO2 SERPL-SCNC: 24 MMOL/L (ref 22–29)
CREAT SERPL-MCNC: 1 MG/DL (ref 0.5–1)
EOSINOPHIL # BLD: 0.1 E9/L (ref 0.05–0.5)
EOSINOPHIL NFR BLD: 0.9 % (ref 0–6)
ERYTHROCYTE [DISTWIDTH] IN BLOOD BY AUTOMATED COUNT: 16.4 FL (ref 11.5–15)
GLUCOSE SERPL-MCNC: 281 MG/DL (ref 74–99)
HBA1C MFR BLD: 7.8 % (ref 4–5.6)
HCT VFR BLD AUTO: 42.1 % (ref 34–48)
HDLC SERPL-MCNC: 55 MG/DL
HGB BLD-MCNC: 12.6 G/DL (ref 11.5–15.5)
IMM GRANULOCYTES # BLD: 0.05 E9/L
IMM GRANULOCYTES NFR BLD: 0.4 % (ref 0–5)
LDL CHOLESTEROL CALCULATED: 66 MG/DL (ref 0–99)
LYMPHOCYTES # BLD: 2.52 E9/L (ref 1.5–4)
LYMPHOCYTES NFR BLD: 22.5 % (ref 20–42)
MCH RBC QN AUTO: 27.2 PG (ref 26–35)
MCHC RBC AUTO-ENTMCNC: 29.9 % (ref 32–34.5)
MCV RBC AUTO: 90.9 FL (ref 80–99.9)
MONOCYTES # BLD: 0.52 E9/L (ref 0.1–0.95)
MONOCYTES NFR BLD: 4.6 % (ref 2–12)
NEUTROPHILS # BLD: 7.98 E9/L (ref 1.8–7.3)
NEUTS SEG NFR BLD: 71.2 % (ref 43–80)
PLATELET # BLD AUTO: 242 E9/L (ref 130–450)
PMV BLD AUTO: 12 FL (ref 7–12)
POTASSIUM SERPL-SCNC: 5.1 MMOL/L (ref 3.5–5)
PROT SERPL-MCNC: 7.1 G/DL (ref 6.4–8.3)
RBC # BLD AUTO: 4.63 E12/L (ref 3.5–5.5)
SODIUM SERPL-SCNC: 139 MMOL/L (ref 132–146)
T4 FREE SERPL-MCNC: 1.85 NG/DL (ref 0.93–1.7)
TRIGLYCERIDE, FASTING: 134 MG/DL (ref 0–149)
TSH SERPL-MCNC: 3.63 UIU/ML (ref 0.27–4.2)
VLDLC SERPL CALC-MCNC: 27 MG/DL
WBC # BLD: 11.2 E9/L (ref 4.5–11.5)

## 2023-04-20 PROCEDURE — 1036F TOBACCO NON-USER: CPT | Performed by: INTERNAL MEDICINE

## 2023-04-20 PROCEDURE — 3017F COLORECTAL CA SCREEN DOC REV: CPT | Performed by: INTERNAL MEDICINE

## 2023-04-20 PROCEDURE — 3078F DIAST BP <80 MM HG: CPT | Performed by: INTERNAL MEDICINE

## 2023-04-20 PROCEDURE — 99214 OFFICE O/P EST MOD 30 MIN: CPT | Performed by: INTERNAL MEDICINE

## 2023-04-20 PROCEDURE — 2022F DILAT RTA XM EVC RTNOPTHY: CPT | Performed by: INTERNAL MEDICINE

## 2023-04-20 PROCEDURE — G8427 DOCREV CUR MEDS BY ELIG CLIN: HCPCS | Performed by: INTERNAL MEDICINE

## 2023-04-20 PROCEDURE — 3075F SYST BP GE 130 - 139MM HG: CPT | Performed by: INTERNAL MEDICINE

## 2023-04-20 PROCEDURE — G8417 CALC BMI ABV UP PARAM F/U: HCPCS | Performed by: INTERNAL MEDICINE

## 2023-04-20 PROCEDURE — 3046F HEMOGLOBIN A1C LEVEL >9.0%: CPT | Performed by: INTERNAL MEDICINE

## 2023-04-20 RX ORDER — AMLODIPINE BESYLATE 5 MG/1
5 TABLET ORAL DAILY
Qty: 90 TABLET | Refills: 0 | Status: SHIPPED | OUTPATIENT
Start: 2023-04-20

## 2023-04-20 RX ORDER — LEVOTHYROXINE SODIUM 88 UG/1
88 TABLET ORAL DAILY
Qty: 90 TABLET | Refills: 0 | Status: SHIPPED | OUTPATIENT
Start: 2023-04-20 | End: 2023-07-19

## 2023-04-20 SDOH — ECONOMIC STABILITY: FOOD INSECURITY: WITHIN THE PAST 12 MONTHS, THE FOOD YOU BOUGHT JUST DIDN'T LAST AND YOU DIDN'T HAVE MONEY TO GET MORE.: SOMETIMES TRUE

## 2023-04-20 SDOH — ECONOMIC STABILITY: INCOME INSECURITY: HOW HARD IS IT FOR YOU TO PAY FOR THE VERY BASICS LIKE FOOD, HOUSING, MEDICAL CARE, AND HEATING?: SOMEWHAT HARD

## 2023-04-20 SDOH — ECONOMIC STABILITY: FOOD INSECURITY: WITHIN THE PAST 12 MONTHS, YOU WORRIED THAT YOUR FOOD WOULD RUN OUT BEFORE YOU GOT MONEY TO BUY MORE.: SOMETIMES TRUE

## 2023-04-20 SDOH — ECONOMIC STABILITY: HOUSING INSECURITY
IN THE LAST 12 MONTHS, WAS THERE A TIME WHEN YOU DID NOT HAVE A STEADY PLACE TO SLEEP OR SLEPT IN A SHELTER (INCLUDING NOW)?: NO

## 2023-04-20 ASSESSMENT — PATIENT HEALTH QUESTIONNAIRE - PHQ9
8. MOVING OR SPEAKING SO SLOWLY THAT OTHER PEOPLE COULD HAVE NOTICED. OR THE OPPOSITE, BEING SO FIGETY OR RESTLESS THAT YOU HAVE BEEN MOVING AROUND A LOT MORE THAN USUAL: 0
SUM OF ALL RESPONSES TO PHQ9 QUESTIONS 1 & 2: 0
3. TROUBLE FALLING OR STAYING ASLEEP: 3
5. POOR APPETITE OR OVEREATING: 0
1. LITTLE INTEREST OR PLEASURE IN DOING THINGS: 0
10. IF YOU CHECKED OFF ANY PROBLEMS, HOW DIFFICULT HAVE THESE PROBLEMS MADE IT FOR YOU TO DO YOUR WORK, TAKE CARE OF THINGS AT HOME, OR GET ALONG WITH OTHER PEOPLE: 1
6. FEELING BAD ABOUT YOURSELF - OR THAT YOU ARE A FAILURE OR HAVE LET YOURSELF OR YOUR FAMILY DOWN: 0
SUM OF ALL RESPONSES TO PHQ QUESTIONS 1-9: 6
9. THOUGHTS THAT YOU WOULD BE BETTER OFF DEAD, OR OF HURTING YOURSELF: 0
7. TROUBLE CONCENTRATING ON THINGS, SUCH AS READING THE NEWSPAPER OR WATCHING TELEVISION: 0
2. FEELING DOWN, DEPRESSED OR HOPELESS: 0
SUM OF ALL RESPONSES TO PHQ QUESTIONS 1-9: 6
4. FEELING TIRED OR HAVING LITTLE ENERGY: 3

## 2023-04-27 ENCOUNTER — OFFICE VISIT (OUTPATIENT)
Dept: PODIATRY | Age: 63
End: 2023-04-27
Payer: MEDICARE

## 2023-04-27 VITALS — HEIGHT: 63 IN | BODY MASS INDEX: 51.91 KG/M2 | WEIGHT: 293 LBS

## 2023-04-27 DIAGNOSIS — R26.2 DIFFICULTY WALKING: ICD-10-CM

## 2023-04-27 DIAGNOSIS — E11.42 DIABETIC POLYNEUROPATHY ASSOCIATED WITH TYPE 2 DIABETES MELLITUS (HCC): Primary | ICD-10-CM

## 2023-04-27 DIAGNOSIS — R20.2 PARESTHESIA OF BOTH LOWER EXTREMITIES: ICD-10-CM

## 2023-04-27 PROCEDURE — 1036F TOBACCO NON-USER: CPT | Performed by: PODIATRIST

## 2023-04-27 PROCEDURE — 99213 OFFICE O/P EST LOW 20 MIN: CPT | Performed by: PODIATRIST

## 2023-04-27 PROCEDURE — G8427 DOCREV CUR MEDS BY ELIG CLIN: HCPCS | Performed by: PODIATRIST

## 2023-04-27 PROCEDURE — 2022F DILAT RTA XM EVC RTNOPTHY: CPT | Performed by: PODIATRIST

## 2023-04-27 PROCEDURE — G8417 CALC BMI ABV UP PARAM F/U: HCPCS | Performed by: PODIATRIST

## 2023-04-27 PROCEDURE — 3017F COLORECTAL CA SCREEN DOC REV: CPT | Performed by: PODIATRIST

## 2023-04-27 PROCEDURE — 3051F HG A1C>EQUAL 7.0%<8.0%: CPT | Performed by: PODIATRIST

## 2023-04-27 RX ORDER — LEUCOVORIN/PYRIDOX/MECOBALAMIN 4-50-2 MG
4-50 TABLET ORAL 2 TIMES DAILY
Qty: 90 TABLET | Refills: 2 | Status: SHIPPED | OUTPATIENT
Start: 2023-04-27

## 2023-04-27 NOTE — PROGRESS NOTES
Patient is in today for 3 month neuropathy follow up. Patient says her feet are doing better, but she is still having pain.  Pcp is Keyana Frias MD  Last ov 4/20/23
TIMES DAILY, Disp: , Rfl:     ONE TOUCH ULTRASOFT LANCETS MISC, , Disp: , Rfl:     BASAGLAR KWIKPEN 100 UNIT/ML injection pen, INJECT 32 UNITS SUBCUTANEOUSLY AND INTO THE SKIN IN THE MORNING AND 32 UNITS BEFORE BEDTIME (Patient taking differently: Inject 35 Units into the skin 2 times daily), Disp: 15 mL, Rfl: 10    vitamin D (ERGOCALCIFEROL) 36768 units CAPS capsule, Take 1 capsule by mouth once a week, Disp: , Rfl:     insulin aspart (NOVOLOG) 100 UNIT/ML injection vial, Inject into the skin 3 times daily (before meals), Disp: , Rfl:     acetaminophen (TYLENOL) 500 MG tablet, Take 1 tablet by mouth every 6 hours as needed for Pain, Disp: , Rfl:     fluticasone (FLONASE) 50 MCG/ACT nasal spray, 1 spray by Nasal route as needed, Disp: , Rfl:     Allergies: Allergies   Allergen Reactions    Asa [Aspirin]      sensitivity    Codeine      Vomiting not sure if caused by codeine    Other      Sutures   Body rejecting sutures-- incisions open up    Tape [Adhesive Tape] Other (See Comments)     Peels skin off       Vitals:    04/27/23 1323   Weight: (!) 423 lb (191.9 kg)   Height: 5' 3\" (1.6 m)       Exam:  Neurovascular status unchanged. Neuropathy issues stable to both lower extremities. No maceration webspaces noted bilateral foot. Edematous issues stable to both lower extremities. Patient is wheelchair-bound at this time with limited ambulatory activities. Diagnostic Studies:     No results found. Procedures:    None    Plan Per Assessment  Nimisha was seen today for diabetes.     Diagnoses and all orders for this visit:    Diabetic polyneuropathy associated with type 2 diabetes mellitus (HCC)  -     Folinic Acid-Vit B6-Vit B12 (FOLINIC-PLUS) 4-50-2 MG TABS; Take 4-50 mg by mouth 2 times daily    Paresthesia of both lower extremities  -     Folinic Acid-Vit B6-Vit B12 (FOLINIC-PLUS) 4-50-2 MG TABS; Take 4-50 mg by mouth 2 times daily    Difficulty walking      Evaluation and management  Additional

## 2023-05-02 DIAGNOSIS — Z12.31 ENCOUNTER FOR SCREENING MAMMOGRAM FOR MALIGNANT NEOPLASM OF BREAST: Primary | ICD-10-CM

## 2023-05-04 RX ORDER — VENLAFAXINE 75 MG/1
75 TABLET ORAL DAILY
Qty: 90 TABLET | Refills: 1 | Status: SHIPPED | OUTPATIENT
Start: 2023-05-04

## 2023-05-08 RX ORDER — LOSARTAN POTASSIUM 100 MG/1
100 TABLET ORAL DAILY
Qty: 30 TABLET | Refills: 5 | Status: SHIPPED | OUTPATIENT
Start: 2023-05-08

## 2023-05-08 RX ORDER — FUROSEMIDE 20 MG/1
20 TABLET ORAL DAILY
Qty: 30 TABLET | Refills: 5 | Status: SHIPPED | OUTPATIENT
Start: 2023-05-08

## 2023-05-16 LAB — DIABETIC RETINOPATHY: NEGATIVE

## 2023-05-18 ENCOUNTER — HOSPITAL ENCOUNTER (OUTPATIENT)
Dept: MAMMOGRAPHY | Age: 63
Discharge: HOME | End: 2023-05-20
Payer: MEDICARE

## 2023-05-18 VITALS — WEIGHT: 293 LBS | BODY MASS INDEX: 50.02 KG/M2 | HEIGHT: 64 IN

## 2023-05-18 DIAGNOSIS — Z12.31 ENCOUNTER FOR SCREENING MAMMOGRAM FOR MALIGNANT NEOPLASM OF BREAST: ICD-10-CM

## 2023-05-18 PROCEDURE — 77063 BREAST TOMOSYNTHESIS BI: CPT

## 2023-06-07 RX ORDER — FLUTICASONE PROPIONATE 50 MCG
1 SPRAY, SUSPENSION (ML) NASAL DAILY
Qty: 16 G | Refills: 1 | Status: SHIPPED | OUTPATIENT
Start: 2023-06-07

## 2023-06-08 RX ORDER — INSULIN GLARGINE 100 [IU]/ML
INJECTION, SOLUTION SUBCUTANEOUS
Qty: 15 ML | Refills: 10 | Status: SHIPPED | OUTPATIENT
Start: 2023-06-08

## 2023-06-20 RX ORDER — AMLODIPINE BESYLATE 5 MG/1
5 TABLET ORAL DAILY
Qty: 90 TABLET | Refills: 0 | Status: SHIPPED | OUTPATIENT
Start: 2023-06-20

## 2023-06-26 RX ORDER — ISOPROPYL ALCOHOL 70 ML/100ML
SWAB TOPICAL
Qty: 100 EACH | Refills: 10 | Status: SHIPPED | OUTPATIENT
Start: 2023-06-26

## 2023-07-19 ENCOUNTER — TELEPHONE (OUTPATIENT)
Dept: PRIMARY CARE CLINIC | Age: 63
End: 2023-07-19

## 2023-07-19 DIAGNOSIS — E11.42 DIABETIC POLYNEUROPATHY ASSOCIATED WITH TYPE 2 DIABETES MELLITUS (HCC): Primary | ICD-10-CM

## 2023-07-19 NOTE — TELEPHONE ENCOUNTER
Faxed CMN form for pt for lift chair. That also need RX and visit summary where a lift chair was discussed. Needs faxed to [phone]  Lift chair rx pended for sig. Are you able to addend note from 04/20 and add that pt would benefit from lift chair?

## 2023-07-31 RX ORDER — INSULIN ASPART 100 [IU]/ML
INJECTION, SOLUTION INTRAVENOUS; SUBCUTANEOUS
Qty: 15 ML | Refills: 10 | Status: SHIPPED | OUTPATIENT
Start: 2023-07-31

## 2023-08-31 RX ORDER — FLUTICASONE PROPIONATE 50 MCG
SPRAY, SUSPENSION (ML) NASAL
Qty: 16 G | Refills: 3 | Status: SHIPPED | OUTPATIENT
Start: 2023-08-31

## 2023-09-13 SDOH — HEALTH STABILITY: PHYSICAL HEALTH: ON AVERAGE, HOW MANY DAYS PER WEEK DO YOU ENGAGE IN MODERATE TO STRENUOUS EXERCISE (LIKE A BRISK WALK)?: 0 DAYS

## 2023-09-13 ASSESSMENT — PATIENT HEALTH QUESTIONNAIRE - PHQ9
2. FEELING DOWN, DEPRESSED OR HOPELESS: 0
SUM OF ALL RESPONSES TO PHQ QUESTIONS 1-9: 1
SUM OF ALL RESPONSES TO PHQ QUESTIONS 1-9: 1
SUM OF ALL RESPONSES TO PHQ9 QUESTIONS 1 & 2: 1
SUM OF ALL RESPONSES TO PHQ QUESTIONS 1-9: 1
1. LITTLE INTEREST OR PLEASURE IN DOING THINGS: 1
SUM OF ALL RESPONSES TO PHQ QUESTIONS 1-9: 1

## 2023-09-13 ASSESSMENT — LIFESTYLE VARIABLES
HOW MANY STANDARD DRINKS CONTAINING ALCOHOL DO YOU HAVE ON A TYPICAL DAY: 1
HOW OFTEN DO YOU HAVE A DRINK CONTAINING ALCOHOL: MONTHLY OR LESS
HOW OFTEN DO YOU HAVE SIX OR MORE DRINKS ON ONE OCCASION: 1
HOW OFTEN DO YOU HAVE A DRINK CONTAINING ALCOHOL: 2
HOW MANY STANDARD DRINKS CONTAINING ALCOHOL DO YOU HAVE ON A TYPICAL DAY: 1 OR 2

## 2023-09-14 ENCOUNTER — OFFICE VISIT (OUTPATIENT)
Dept: PRIMARY CARE CLINIC | Age: 63
End: 2023-09-14
Payer: MEDICARE

## 2023-09-14 VITALS
OXYGEN SATURATION: 95 % | HEIGHT: 64 IN | RESPIRATION RATE: 18 BRPM | HEART RATE: 86 BPM | DIASTOLIC BLOOD PRESSURE: 59 MMHG | TEMPERATURE: 98.7 F | WEIGHT: 293 LBS | SYSTOLIC BLOOD PRESSURE: 127 MMHG | BODY MASS INDEX: 50.02 KG/M2

## 2023-09-14 DIAGNOSIS — Z79.4 TYPE 2 DIABETES MELLITUS WITH HYPERGLYCEMIA, WITH LONG-TERM CURRENT USE OF INSULIN (HCC): ICD-10-CM

## 2023-09-14 DIAGNOSIS — M83.9 VITAMIN D DEFICIENT OSTEOMALACIA: ICD-10-CM

## 2023-09-14 DIAGNOSIS — E11.65 TYPE 2 DIABETES MELLITUS WITH HYPERGLYCEMIA, WITH LONG-TERM CURRENT USE OF INSULIN (HCC): ICD-10-CM

## 2023-09-14 DIAGNOSIS — I10 ESSENTIAL (PRIMARY) HYPERTENSION: ICD-10-CM

## 2023-09-14 DIAGNOSIS — E03.9 ACQUIRED HYPOTHYROIDISM: ICD-10-CM

## 2023-09-14 DIAGNOSIS — E78.2 MIXED HYPERLIPIDEMIA: ICD-10-CM

## 2023-09-14 DIAGNOSIS — R26.9 GAIT DISORDER: ICD-10-CM

## 2023-09-14 DIAGNOSIS — Z23 NEED FOR INFLUENZA VACCINATION: ICD-10-CM

## 2023-09-14 DIAGNOSIS — Z00.00 MEDICARE ANNUAL WELLNESS VISIT, SUBSEQUENT: Primary | ICD-10-CM

## 2023-09-14 DIAGNOSIS — Z13.9 SCREENING DUE: ICD-10-CM

## 2023-09-14 LAB
ABSOLUTE IMMATURE GRANULOCYTE: 0.04 K/UL (ref 0–0.58)
ALBUMIN SERPL-MCNC: 3.9 G/DL (ref 3.5–5.2)
ALP BLD-CCNC: 104 U/L (ref 35–104)
ALT SERPL-CCNC: 15 U/L (ref 0–32)
ANION GAP SERPL CALCULATED.3IONS-SCNC: 17 MMOL/L (ref 7–16)
AST SERPL-CCNC: 17 U/L (ref 0–31)
BASOPHILS ABSOLUTE: 0.05 K/UL (ref 0–0.2)
BASOPHILS RELATIVE PERCENT: 1 % (ref 0–2)
BILIRUB SERPL-MCNC: 0.5 MG/DL (ref 0–1.2)
BUN BLDV-MCNC: 23 MG/DL (ref 6–23)
CALCIUM SERPL-MCNC: 9.4 MG/DL (ref 8.6–10.2)
CHLORIDE BLD-SCNC: 98 MMOL/L (ref 98–107)
CHOLESTEROL, FASTING: 142 MG/DL
CO2: 25 MMOL/L (ref 22–29)
CREAT SERPL-MCNC: 0.9 MG/DL (ref 0.5–1)
EOSINOPHILS ABSOLUTE: 0.14 K/UL (ref 0.05–0.5)
EOSINOPHILS RELATIVE PERCENT: 1 % (ref 0–6)
GFR SERPL CREATININE-BSD FRML MDRD: >60 ML/MIN/1.73M2
GLUCOSE BLD-MCNC: 195 MG/DL (ref 74–99)
HBA1C MFR BLD: 7.2 %
HBA1C MFR BLD: 7.4 % (ref 4–5.6)
HCT VFR BLD CALC: 38 % (ref 34–48)
HDLC SERPL-MCNC: 52 MG/DL
HEMOGLOBIN: 11.8 G/DL (ref 11.5–15.5)
IMMATURE GRANULOCYTES: 0 % (ref 0–5)
LDL CHOLESTEROL: 63 MG/DL
LYMPHOCYTES ABSOLUTE: 1.94 K/UL (ref 1.5–4)
LYMPHOCYTES RELATIVE PERCENT: 19 % (ref 20–42)
MCH RBC QN AUTO: 27.4 PG (ref 26–35)
MCHC RBC AUTO-ENTMCNC: 31.1 G/DL (ref 32–34.5)
MCV RBC AUTO: 88.2 FL (ref 80–99.9)
MONOCYTES ABSOLUTE: 0.53 K/UL (ref 0.1–0.95)
MONOCYTES RELATIVE PERCENT: 5 % (ref 2–12)
NEUTROPHILS ABSOLUTE: 7.59 K/UL (ref 1.8–7.3)
NEUTROPHILS RELATIVE PERCENT: 74 % (ref 43–80)
PDW BLD-RTO: 15.9 % (ref 11.5–15)
PLATELET # BLD: 244 K/UL (ref 130–450)
PMV BLD AUTO: 12.4 FL (ref 7–12)
POTASSIUM SERPL-SCNC: 4.3 MMOL/L (ref 3.5–5)
RBC # BLD: 4.31 M/UL (ref 3.5–5.5)
SODIUM BLD-SCNC: 140 MMOL/L (ref 132–146)
T4 FREE: 1.7 NG/DL (ref 0.9–1.7)
TOTAL PROTEIN: 7.1 G/DL (ref 6.4–8.3)
TRIGLYCERIDE, FASTING: 134 MG/DL
TSH SERPL DL<=0.05 MIU/L-ACNC: 4.02 UIU/ML (ref 0.27–4.2)
VITAMIN D 25-HYDROXY: 39.4 NG/ML (ref 30–100)
VLDLC SERPL CALC-MCNC: 27 MG/DL
WBC # BLD: 10.3 K/UL (ref 4.5–11.5)

## 2023-09-14 PROCEDURE — 1036F TOBACCO NON-USER: CPT | Performed by: INTERNAL MEDICINE

## 2023-09-14 PROCEDURE — 3051F HG A1C>EQUAL 7.0%<8.0%: CPT | Performed by: INTERNAL MEDICINE

## 2023-09-14 PROCEDURE — 90674 CCIIV4 VAC NO PRSV 0.5 ML IM: CPT | Performed by: INTERNAL MEDICINE

## 2023-09-14 PROCEDURE — 3074F SYST BP LT 130 MM HG: CPT | Performed by: INTERNAL MEDICINE

## 2023-09-14 PROCEDURE — G8427 DOCREV CUR MEDS BY ELIG CLIN: HCPCS | Performed by: INTERNAL MEDICINE

## 2023-09-14 PROCEDURE — G8417 CALC BMI ABV UP PARAM F/U: HCPCS | Performed by: INTERNAL MEDICINE

## 2023-09-14 PROCEDURE — 2022F DILAT RTA XM EVC RTNOPTHY: CPT | Performed by: INTERNAL MEDICINE

## 2023-09-14 PROCEDURE — G0439 PPPS, SUBSEQ VISIT: HCPCS | Performed by: INTERNAL MEDICINE

## 2023-09-14 PROCEDURE — 3078F DIAST BP <80 MM HG: CPT | Performed by: INTERNAL MEDICINE

## 2023-09-14 PROCEDURE — 99214 OFFICE O/P EST MOD 30 MIN: CPT | Performed by: INTERNAL MEDICINE

## 2023-09-14 PROCEDURE — G0008 ADMIN INFLUENZA VIRUS VAC: HCPCS | Performed by: INTERNAL MEDICINE

## 2023-09-14 PROCEDURE — 83036 HEMOGLOBIN GLYCOSYLATED A1C: CPT | Performed by: INTERNAL MEDICINE

## 2023-09-14 PROCEDURE — 3017F COLORECTAL CA SCREEN DOC REV: CPT | Performed by: INTERNAL MEDICINE

## 2023-09-14 RX ORDER — ERGOCALCIFEROL 1.25 MG/1
50000 CAPSULE ORAL WEEKLY
Qty: 4 CAPSULE | Refills: 5 | Status: SHIPPED | OUTPATIENT
Start: 2023-09-14

## 2023-09-14 ASSESSMENT — PATIENT HEALTH QUESTIONNAIRE - PHQ9
3. TROUBLE FALLING OR STAYING ASLEEP: 2
6. FEELING BAD ABOUT YOURSELF - OR THAT YOU ARE A FAILURE OR HAVE LET YOURSELF OR YOUR FAMILY DOWN: 1
1. LITTLE INTEREST OR PLEASURE IN DOING THINGS: 1
SUM OF ALL RESPONSES TO PHQ QUESTIONS 1-9: 9
SUM OF ALL RESPONSES TO PHQ9 QUESTIONS 1 & 2: 1
SUM OF ALL RESPONSES TO PHQ QUESTIONS 1-9: 9
10. IF YOU CHECKED OFF ANY PROBLEMS, HOW DIFFICULT HAVE THESE PROBLEMS MADE IT FOR YOU TO DO YOUR WORK, TAKE CARE OF THINGS AT HOME, OR GET ALONG WITH OTHER PEOPLE: 1
9. THOUGHTS THAT YOU WOULD BE BETTER OFF DEAD, OR OF HURTING YOURSELF: 0
7. TROUBLE CONCENTRATING ON THINGS, SUCH AS READING THE NEWSPAPER OR WATCHING TELEVISION: 0
4. FEELING TIRED OR HAVING LITTLE ENERGY: 3
SUM OF ALL RESPONSES TO PHQ QUESTIONS 1-9: 9
SUM OF ALL RESPONSES TO PHQ QUESTIONS 1-9: 9
2. FEELING DOWN, DEPRESSED OR HOPELESS: 0
5. POOR APPETITE OR OVEREATING: 2
8. MOVING OR SPEAKING SO SLOWLY THAT OTHER PEOPLE COULD HAVE NOTICED. OR THE OPPOSITE, BEING SO FIGETY OR RESTLESS THAT YOU HAVE BEEN MOVING AROUND A LOT MORE THAN USUAL: 0

## 2023-09-14 ASSESSMENT — ENCOUNTER SYMPTOMS
EYE REDNESS: 0
ALLERGIC/IMMUNOLOGIC NEGATIVE: 1
SINUS PAIN: 0
EYES NEGATIVE: 1
FACIAL SWELLING: 0
RHINORRHEA: 0
RESPIRATORY NEGATIVE: 1
EYE ITCHING: 0
EYE PAIN: 0
EYE DISCHARGE: 0
GASTROINTESTINAL NEGATIVE: 1

## 2023-09-14 NOTE — PROGRESS NOTES
atraumatic. Right Ear: Tympanic membrane, ear canal and external ear normal.      Left Ear: Tympanic membrane, ear canal and external ear normal. There is no impacted cerumen. Nose: Nose normal. No congestion or rhinorrhea. Mouth/Throat:      Mouth: Mucous membranes are moist.      Pharynx: Oropharynx is clear. No oropharyngeal exudate or posterior oropharyngeal erythema. Eyes:      General: No scleral icterus. Right eye: No discharge. Left eye: No discharge. Pupils: Pupils are equal, round, and reactive to light. Neck:      Vascular: No carotid bruit. Cardiovascular:      Rate and Rhythm: Normal rate and regular rhythm. Pulses: Normal pulses. Heart sounds: No murmur heard. No gallop. Pulmonary:      Effort: Pulmonary effort is normal. No respiratory distress. Breath sounds: Normal breath sounds. No wheezing, rhonchi or rales. Chest:      Chest wall: No tenderness. Abdominal:      General: Bowel sounds are normal.      Palpations: Abdomen is soft. There is no mass. Tenderness: There is no abdominal tenderness. There is no guarding. Hernia: No hernia is present. Musculoskeletal:         General: No swelling, tenderness, deformity or signs of injury. Cervical back: Normal range of motion and neck supple. No rigidity or tenderness. Right lower leg: No edema. Left lower leg: No edema. Lymphadenopathy:      Cervical: No cervical adenopathy. Skin:     General: Skin is warm and dry. Capillary Refill: Capillary refill takes 2 to 3 seconds. Findings: No bruising, lesion or rash. Neurological:      General: No focal deficit present. Mental Status: She is alert and oriented to person, place, and time. Sensory: No sensory deficit. Motor: No weakness. Gait: Gait normal.   Psychiatric:         Mood and Affect: Mood normal.         Behavior: Behavior normal.         Thought Content:  Thought content

## 2023-09-15 DIAGNOSIS — R26.9 GAIT DISORDER: Primary | ICD-10-CM

## 2023-09-15 LAB — HEPATITIS C ANTIBODY: NONREACTIVE

## 2023-09-18 DIAGNOSIS — Z79.4 TYPE 2 DIABETES MELLITUS WITH HYPERGLYCEMIA, WITH LONG-TERM CURRENT USE OF INSULIN (HCC): Primary | ICD-10-CM

## 2023-09-18 DIAGNOSIS — E11.65 TYPE 2 DIABETES MELLITUS WITH HYPERGLYCEMIA, WITH LONG-TERM CURRENT USE OF INSULIN (HCC): Primary | ICD-10-CM

## 2023-09-19 ENCOUNTER — TELEPHONE (OUTPATIENT)
Dept: PRIMARY CARE CLINIC | Age: 63
End: 2023-09-19
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
## 2023-09-19 NOTE — TELEPHONE ENCOUNTER
How long that she have this CPAP I do not have a sleep study she might need to have an repeat sleep study pressure with the change of weight per her to sleep clinic
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
## 2023-09-19 NOTE — TELEPHONE ENCOUNTER
Spoke to pt, stated she had sleep study done 2-3 years ago and does not want to repeat.  Had a recall on her CPAP machine and just got a new one a year ago and would just like this replaced  [facility]

## 2023-09-20 NOTE — PROGRESS NOTES
[facility] OUTPATIENT REHABILITATION  PHYSICAL THERAPY INITIAL EVALUATION         Date:  2023   Patient: Irene Bright  : 1960  MRN: [id number]  Referring Provider: Mukul Mercado MD  [address]  [address] [facility]     Medical Diagnosis: Gait disorder (R26.9  Onset date: few months  Mechanism of Injury: Obesity  Chief complaint: SOB, LE weakness, difficulty with mobility.      SUBJECTIVE:     Past Medical History  Past Medical History:   Diagnosis Date    Arthritis     hands    History of blood transfusion     Hypertension     no meds    Hypothyroidism     Sleep apnea     Surgical wound, non healing     Type 2 diabetes mellitus without complication (HCC)     Type II or unspecified type diabetes mellitus without mention of complication, not stated as uncontrolled     diet controlled     Past Surgical History:   Procedure Laterality Date    ABDOMEN SURGERY  10/23/2015     excisional debridement abdominal wound    APPENDECTOMY      CARDIAC SURGERY      negative    CHOLECYSTECTOMY      COLON SURGERY      intestinal, stomach (part)    OTHER SURGICAL HISTORY  16    abd wound debridement    IA EXC B9 LESION MRGN XCP SK TG T/A/L 0.5 CM/< N/A 2018    EXCISION ABDOMINAL WALL WOUND performed by Francheska Denson MD at [address] [facility] ENDOSCOPY         Medications:   Current Outpatient Medications   Medication Sig Dispense Refill    vitamin D (ERGOCALCIFEROL) 1.25 MG (45042 UT) CAPS capsule Take 1 capsule by mouth once a week 4 capsule 5    Lift Chair MISC by Does not apply route 1 each 0    fluticasone (FLONASE) 50 MCG/ACT nasal spray INSTILL 1 SPRAY INTRANASALLY DAILY 16 g 3    insulin aspart (NOVOLOG FLEXPEN) 100 UNIT/ML injection pen INJECT SUBCUTANEOUSLY PER SLIDING SCALE <100=0 UNITS 100- 150=4U 151-200=6U 201-250=8U 251 -300=10U >300=12U CALL OFFICE >500 *MAX DAILY DOSE: 36 UNITS* 15 mL 10    Alcohol Swabs (EASY TOUCH

## 2023-09-20 NOTE — PROGRESS NOTES
[id number] [facility]  Physical Therapy Daily Treatment Note    Date: 2023  Patient Name: Carlos A Bustillo  : 1960   MRN: [id number]  DOInjury: na   DOSx: na  Referring Provider: Barbara Mares MD  [address],  [address] [facility] Diagnosis: Gait disorder (R26.9    Outcome Measure:  LEFS= 20    S: See eval  O:   Time 2681-3673     Visit 1/6,12     Pain 0/10     ROM       Modalities      Exercise             LAQ      Hamstring Curl        squats       calf raises      Hip abd      Hip ext      March with AW                  THERAPEUTIC ACTIVITIES Large, functional, dynamic, global movements used to build strength, balance, endurance, and flexibility and to improve physical performance. Step-ups - FWD      Step-ups - LAT                                      NMR Feedback and cues necessary for developing neuromuscular control. Movement education and guided movement interventions  used to improve performance and control. To improve balance for safe community and home ambulation           March      Side stepping      Retro walk      Heel to toe      Fwd with cones      A:  Tolerated well. Above added to written HEP.   P: Continue with rehab plan  Jeri Fernandes PT    Treatment Charges: Mins Units   Initial Evaluation 37 1   Re-Evaluation     Ther Exercise         TE 8 1   Manual Therapy     MT     Ther Activities        TA     Gait Training          GT     Neuro Re-education NR     Modalities     Non-Billable Service Time     Other     Total Time/Units 45 2

## 2023-09-21 ENCOUNTER — HOSPITAL ENCOUNTER (OUTPATIENT)
Dept: PHYSICAL THERAPY | Age: 63
Setting detail: THERAPIES SERIES
Discharge: HOME | End: 2023-09-21
Attending: INTERNAL MEDICINE
Payer: MEDICARE

## 2023-09-21 PROCEDURE — 97110 THERAPEUTIC EXERCISES: CPT | Performed by: PHYSICAL THERAPIST

## 2023-09-21 PROCEDURE — 97161 PT EVAL LOW COMPLEX 20 MIN: CPT | Performed by: PHYSICAL THERAPIST

## 2023-09-25 RX ORDER — MONTELUKAST SODIUM 10 MG/1
10 TABLET ORAL DAILY
Qty: 30 TABLET | Refills: 0 | Status: SHIPPED | OUTPATIENT
Start: 2023-09-25

## 2023-09-25 RX ORDER — LEVOTHYROXINE SODIUM 88 UG/1
88 TABLET ORAL DAILY
Qty: 90 TABLET | Refills: 1 | Status: SHIPPED | OUTPATIENT
Start: 2023-09-25 | End: 2023-12-24

## 2023-09-25 RX ORDER — AMLODIPINE BESYLATE 5 MG/1
5 TABLET ORAL DAILY
Qty: 30 TABLET | Refills: 0 | Status: SHIPPED | OUTPATIENT
Start: 2023-09-25

## 2023-09-27 ENCOUNTER — HOSPITAL ENCOUNTER (OUTPATIENT)
Dept: PHYSICAL THERAPY | Age: 63
Setting detail: THERAPIES SERIES
Discharge: HOME | End: 2023-09-27
Attending: INTERNAL MEDICINE
Payer: MEDICARE

## 2023-09-27 PROCEDURE — 97110 THERAPEUTIC EXERCISES: CPT

## 2023-09-27 NOTE — PROGRESS NOTES
[address] [facility]  Physical Therapy Daily Treatment Note  Date: 2023  Patient Name: Aguilar Pabon  : 1960   MRN: [id number]  DOInjury: na   DOSx: na  Referring Provider: Esperanza Hammans, MD  [address] [facility],[address],  [address] [facility] Diagnosis: Gait disorder (R26.9    Outcome Measure:  LEFS= 20    S: Patient reports performing HEP. Patient has pain ball of left foot especially when weight is transferred to ball of foot: calf raises, lunges. O:   Time 1424-9240 1-2x/week x 6 weeks   Visit ,12 Patient coming 1x/week due to transportation   Pain 0/10    ROM      Modalities     Exercise     LAQ 2# alt. X 2 min    Hamstring Curl  1# Alt. X 1.5 min    calf raises UA due to pain left ball of foot    Hip abd 1# Alt. X 1.5 min    Hip ext 1# Alt. X 1.5 min    March with AW 1# Alt. X 1.5 min              THERAPEUTIC ACTIVITIES Large, functional, dynamic, global movements used to build strength, balance, endurance, and flexibility and to improve physical performance. Step-ups - FWD     Step-ups - LAT     Alt. lunges 1# Alt. X 1.5 min Alt. as able> on right   squats Mini at rail X 10                    NMR Feedback and cues necessary for developing neuromuscular control. Movement education and guided movement interventions  used to improve performance and control. To improve balance for safe community and home ambulation         March     Side stepping     Retro walk     Heel to toe     Fwd with cones     A:  Tolerated well. P: Continue with rehab plan.   Polina Hutton PTA    Treatment Charges: Mins Units   Initial Evaluation     Re-Evaluation     Ther Exercise         TE 32 2   Manual Therapy     MT     Ther Activities        TA     Gait Training          GT     Neuro Re-education NR     Modalities     Non-Billable Service Time     Other     Total Time/Units 32 2

## 2023-10-04 ENCOUNTER — HOSPITAL ENCOUNTER (OUTPATIENT)
Dept: PHYSICAL THERAPY | Age: 63
Setting detail: THERAPIES SERIES
Discharge: HOME | End: 2023-10-04
Attending: INTERNAL MEDICINE
Payer: MEDICARE

## 2023-10-04 PROCEDURE — 97110 THERAPEUTIC EXERCISES: CPT

## 2023-10-04 NOTE — PROGRESS NOTES
[address] [facility]  Physical Therapy Daily Treatment Note  Date: 10/4/2023  Patient Name: Teresa Weiner  : 1960   MRN: [id number]  DOInjury: na   DOSx: na  Referring Provider: Christina Delgado MD  [address] [facility],[address],  [address] [facility] Diagnosis: Gait disorder (R26.9    Outcome Measure:  LEFS= 20    S: Patient reports tolerable soreness after last session. She tried to find a spot at home to do some standing exercises, but not a safe spot; is performing seated exercises: LAQ, seated march. Added seated hip abduction with resistance and seated hip add with glute sets. . She has been having some abdominal pains last few days from hernia, not from exercises. Physician aware. O:   Time 3173-6320 1-2x/week x 6 weeks   Visit 3/6,12 Patient coming 1x/week due to transportation   Pain 0/10    ROM      Modalities     Exercise     LAQ 1# alt. X 2 min    Hamstring Curl  1# Alt. X 1.5 min    calf raises ND due to pain left ball of foot    Hip abd 1# Alt. X 1.5 min    Hip ext 1# Alt. X 1.5 min    March with AW 1# Alt. X 1.5 min    Seated hip abd with resistance X 20 for HEP    Seated hip add with glute sets     THERAPEUTIC ACTIVITIES Large, functional, dynamic, global movements used to build strength, balance, endurance, and flexibility and to improve physical performance. Step-ups - FWD     Step-ups - LAT     Alt. lunges Painful due to foot Alt. as able> on right   squats                    NMR Feedback and cues necessary for developing neuromuscular control. Movement education and guided movement interventions  used to improve performance and control. To improve balance for safe community and home ambulation         March     Side stepping     Retro walk     Heel to toe     Fwd with cones     A:  Tolerated well. P: Continue with rehab plan.   Pascual Greene PTA    Treatment Charges: Mins Units   Initial Evaluation     Re-Evaluation     Ther Exercise         TE 35 2   Manual Therapy

## 2023-10-11 ENCOUNTER — HOSPITAL ENCOUNTER (OUTPATIENT)
Dept: PHYSICAL THERAPY | Age: 63
Setting detail: THERAPIES SERIES
Discharge: HOME | End: 2023-10-11
Attending: INTERNAL MEDICINE
Payer: MEDICARE

## 2023-10-11 NOTE — FLOWSHEET NOTE
Physical Therapy - Cancellation    10/11/2023    MRN: [id number]  Flako Resendez      Patient called and left message on machine to cancel appointment for today. She will call back later today to reschedule.     Toñito Carter, PTA

## 2023-10-14 PROBLEM — Z23 NEED FOR INFLUENZA VACCINATION: Status: RESOLVED | Noted: 2023-09-14 | Resolved: 2023-10-14

## 2023-10-18 ENCOUNTER — HOSPITAL ENCOUNTER (OUTPATIENT)
Dept: PHYSICAL THERAPY | Age: 63
Setting detail: THERAPIES SERIES
Discharge: HOME | End: 2023-10-18
Attending: INTERNAL MEDICINE
Payer: MEDICARE

## 2023-10-18 PROCEDURE — 97110 THERAPEUTIC EXERCISES: CPT

## 2023-10-18 NOTE — PROGRESS NOTES
[address] [facility]  Physical Therapy Daily Treatment Note  Date: 10/18/2023  Patient Name: Raymond Kaminski  : 1960   MRN: [id number]  DOInjury: na   DOSx: na  Referring Provider: Cristina Sladivar MD  [address] [facility],[address],  [address] [facility]     Medical Diagnosis: Gait disorder (R26.9    Outcome Measure:  LEFS= 20    S: Patient reports tolerable soreness after last session. Patient states she is hurting from flare up of arthritis, especially left foot. Instructed patient to work within tolerance. O:   Time 5711-7069 1-2x/week x 6 weeks   Visit 4/ Patient coming 1x/week due to transportation   Pain 0/10    ROM      Modalities     Exercise WT: 1# Right/0# Left    LAQ 1# alt. X 2 min    Hamstring Curl  1# Alt. X 1.5 min    calf raises ND due to pain left ball of foot    Hip abd 1# Alt. X 1 min, as tolerated    Hip ext 1# Alt. X 1.5 min    March with AW 1# Alt. X 1.5 min    Seated hip abd with resistance Green x 1.5 min HEP   Seated hip add with glute sets     THERAPEUTIC ACTIVITIES Large, functional, dynamic, global movements used to build strength, balance, endurance, and flexibility and to improve physical performance. Step-ups - FWD     Step-ups - LAT     Alt. lunges Painful due to foot Alt. as able> on right   squats                    NMR Feedback and cues necessary for developing neuromuscular control. Movement education and guided movement interventions  used to improve performance and control. To improve balance for safe community and home ambulation         March     Side stepping     Retro walk     Heel to toe     Fwd with cones     A:  Tolerated well. She works hard. P: Continue with rehab plan.   Candis Gonzalez PTA    Treatment Charges: Mins Units   Initial Evaluation     Re-Evaluation     Ther Exercise         TE 30 2   Manual Therapy     MT     Ther Activities        TA     Gait Training          GT     Neuro Re-education NR     Modalities     Non-Billable Service Time     Other

## 2023-10-24 RX ORDER — VENLAFAXINE 75 MG/1
75 TABLET ORAL DAILY
Qty: 30 TABLET | Refills: 5 | Status: SHIPPED | OUTPATIENT
Start: 2023-10-24

## 2023-10-24 RX ORDER — FUROSEMIDE 20 MG/1
20 TABLET ORAL DAILY
Qty: 30 TABLET | Refills: 5 | Status: SHIPPED | OUTPATIENT
Start: 2023-10-24

## 2023-10-24 RX ORDER — MONTELUKAST SODIUM 10 MG/1
10 TABLET ORAL DAILY
Qty: 30 TABLET | Refills: 5 | Status: SHIPPED | OUTPATIENT
Start: 2023-10-24

## 2023-10-24 RX ORDER — LOSARTAN POTASSIUM 100 MG/1
100 TABLET ORAL DAILY
Qty: 30 TABLET | Refills: 5 | Status: SHIPPED | OUTPATIENT
Start: 2023-10-24

## 2023-10-24 RX ORDER — AMLODIPINE BESYLATE 5 MG/1
5 TABLET ORAL DAILY
Qty: 30 TABLET | Refills: 5 | Status: SHIPPED | OUTPATIENT
Start: 2023-10-24

## 2023-10-25 ENCOUNTER — HOSPITAL ENCOUNTER (OUTPATIENT)
Dept: PHYSICAL THERAPY | Age: 63
Setting detail: THERAPIES SERIES
Discharge: HOME | End: 2023-10-25
Attending: INTERNAL MEDICINE
Payer: MEDICARE

## 2023-10-25 NOTE — FLOWSHEET NOTE
Physical Therapy - Cancellation    10/25/2023    MRN: [id number]  Simón Umana      Patient called to cancel appointment. Rescheduled.      Daksha Rowland, PTA

## 2023-10-30 RX ORDER — VENLAFAXINE 75 MG/1
75 TABLET ORAL DAILY
Qty: 30 TABLET | Refills: 10 | OUTPATIENT
Start: 2023-10-30

## 2023-11-01 ENCOUNTER — HOSPITAL ENCOUNTER (OUTPATIENT)
Dept: PHYSICAL THERAPY | Age: 63
Setting detail: THERAPIES SERIES
Discharge: HOME | End: 2023-11-01
Attending: INTERNAL MEDICINE

## 2023-11-01 NOTE — FLOWSHEET NOTE
Physical Therapy - Cancellation    11/1/2023    MRN: [id number]  Teresa Weiner      Patient called to cancel appointment, not feeling well. Duong Figueroa, PT Lic.   #[id number]

## 2023-11-08 ENCOUNTER — HOSPITAL ENCOUNTER (OUTPATIENT)
Dept: DIABETES SERVICES | Age: 63
Setting detail: THERAPIES SERIES
Discharge: HOME | End: 2023-11-08

## 2023-11-08 ENCOUNTER — HOSPITAL ENCOUNTER (OUTPATIENT)
Dept: PHYSICAL THERAPY | Age: 63
Setting detail: THERAPIES SERIES
Discharge: HOME | End: 2023-11-08
Attending: INTERNAL MEDICINE

## 2023-11-08 NOTE — FLOWSHEET NOTE
Physical Therapy - Cancellation    11/8/2023    MRN: [id number]  Kelly Arrant      Patient  arrived early, she thought her appointment was @2 pm, but was @ 2:30. She had another appointment and couldn't stay. Rescheduled.   Lydia Menchaca, PTA

## 2023-11-15 ENCOUNTER — HOSPITAL ENCOUNTER (OUTPATIENT)
Dept: PHYSICAL THERAPY | Age: 63
Setting detail: THERAPIES SERIES
Discharge: HOME | End: 2023-11-15
Attending: INTERNAL MEDICINE

## 2023-11-15 NOTE — FLOWSHEET NOTE
Physical Therapy - Cancellation    11/15/2023    MRN: [id number]  Keyana Saravia      Patient called to cancel appointment. She was exposed to [address] [place] [address]; she is monitoring for symptoms and quarantining. She scheduled for after quarantine.      Kala Pitts, PTA

## 2023-11-29 ENCOUNTER — HOSPITAL ENCOUNTER (OUTPATIENT)
Dept: PHYSICAL THERAPY | Age: 63
Setting detail: THERAPIES SERIES
Discharge: HOME | End: 2023-11-29
Attending: INTERNAL MEDICINE
Payer: MEDICARE

## 2023-11-29 PROCEDURE — 97110 THERAPEUTIC EXERCISES: CPT

## 2023-11-29 NOTE — PROGRESS NOTES
[address] [facility]  Physical Therapy Daily Treatment Note  Date: 2023  Patient Name: Kentrell Garcia  : 1960   MRN: [id number]  DOInjury: na   DOSx: na  Referring Provider: Juan Bennett MD  [address] [facility],[address],  [address] [facility]     Medical Diagnosis: Gait disorder (R26.9    Outcome Measure:  LEFS= 20    S: Patient reports tolerable soreness after last session. Patient states she is hurting from flare up of arthritis, especially left foot. Instructed patient to work within tolerance. O:   Time 4324-3587 1-2x/week x 6 weeks   Visit 5/6 Patient coming 1x/week due to transportation   Pain 0/10    ROM      Modalities     Exercise     LAQ 1# alt. X 2 min    Hamstring Curl  1# Alt. X 1.5 min    calf raises ND due to pain left ball of foot    Hip abd 1# Alt. X 1:25 min, as tolerated    Hip ext 1# Alt. X 1.5 min    March with AW 1# Alt. X 1.5 min    Seated hip abd with resistance Green x 1.5 min HEP   Seated hip add with glute sets     THERAPEUTIC ACTIVITIES Large, functional, dynamic, global movements used to build strength, balance, endurance, and flexibility and to improve physical performance. Step-ups - FWD     Step-ups - LAT     Alt. lunges Painful due to foot Alt. as able> on right   squats                    NMR Feedback and cues necessary for developing neuromuscular control. Movement education and guided movement interventions  used to improve performance and control. To improve balance for safe community and home ambulation         March     Side stepping     Retro walk     Heel to toe     Fwd with cones     A:  Tolerated well. She works hard. Needed little rest periods. P: Continue with rehab plan.   Michele Morales PTA    Treatment Charges: Mins Units   Initial Evaluation     Re-Evaluation     Ther Exercise         TE 25 2   Manual Therapy     MT     Ther Activities        TA     Gait Training          GT     Neuro Re-education NR     Modalities     Non-Billable Service Time

## 2023-12-04 ENCOUNTER — HOSPITAL ENCOUNTER (OUTPATIENT)
Dept: PHYSICAL THERAPY | Age: 63
Setting detail: THERAPIES SERIES
Discharge: HOME | End: 2023-12-04
Attending: INTERNAL MEDICINE

## 2023-12-04 NOTE — FLOWSHEET NOTE
Physical Therapy - Cancellation    12/4/2023    MRN: [id number]  Simón Umana      Patient called to cancel appointment. She is not feeling well, rescheduled for next week.     Daksha Rowland, PTA

## 2023-12-05 ENCOUNTER — HOSPITAL ENCOUNTER (OUTPATIENT)
Dept: DIABETES SERVICES | Age: 63
Setting detail: THERAPIES SERIES
Discharge: HOME | End: 2023-12-05
Payer: MEDICARE

## 2023-12-05 PROCEDURE — G0109 DIAB MANAGE TRN IND/GROUP: HCPCS

## 2023-12-05 SDOH — ECONOMIC STABILITY: FOOD INSECURITY: ADDITIONAL INFORMATION: NO

## 2023-12-05 ASSESSMENT — PROBLEM AREAS IN DIABETES QUESTIONNAIRE (PAID)
FEELING DEPRESSED WHEN YOU THINK ABOUT LIVING WITH DIABETES: 2
PAID-5 TOTAL SCORE: 9
FEELING SCARED WHEN YOU THINK ABOUT LIVING WITH DIABETES: 2
COPING WITH COMPLICATIONS OF DIABETES: 2
FEELING THAT DIABETES IS TAKING UP TOO MUCH OF YOUR MENTAL AND PHYSICAL ENERGY EVERY DAY: 1
WORRYING ABOUT THE FUTURE AND THE POSSIBILITY OF SERIOUS COMPLICATIONS: 2

## 2023-12-05 NOTE — PROGRESS NOTES
Diabetes Self-Management Education Record    Participant Name: Jo-Ann Fam  Referring Provider: Juwan Ely MD  Assessment/Evaluation Ratings:  1=Needs Instruction   4=Demonstrates Understanding/Competency  2=Needs Review   NC=Not Covered    3=Comprehends Key Points  N/A=Not Applicable  Topics/Learning Objectives Pre-session Assess Date:  Instructor initials/date  12/5/23 PC Instruction provided  12/5/23 PC   Yearly follow-up/  reinforcement date. Post- session Eval Comments   Diabetes disease process & Treatment process:   -Define type of diabetes in simple terms.  - Describe the ABCs of  diabetes management  -Identify own type of diabetes  -Identify lifestyle changes/treatment options  -other:  1 [x] All     []  []  []  []  []  3 12/5/23 PC  Dx in 2002 Type 2 DM  Family hx  No prior DM education   Developing strategies for Healthy coping/psychosocial issues:    -Describe feelings about living with diabetes  -Identify coping strategies and sources of stress  -Identify support needed & support network available  -Complete PAID-5 Diabetes questionnaire 1 [x] All     []  []  []    []  3 Date:12/5/23 PC  PAID-5 Score:9  Confidence Score: 5    Jo-Ann Fam completed a Diabetes Self- Management Education Assessment on 12/5/23. Part of our assessment is having the patient complete the PAID (Problem Areas in Diabetes Scale)-5 survey. This tool  measures diabetes-related emotional distress a patient may be feeling. Jo-Ann Fam scored _9_   A total score of >8 indicates possible diabetes related emotional distress, which warrants further assessment and a referral to mental health professional for psychological support and treatment. Behavioral Health Services information given.    Prevention, detection & treatment of Chronic complications:    -Identify the prevention, detection and treatment for complications including immunizations, preventive eye, foot, dental and renal exams as indicated per the

## 2023-12-05 NOTE — PROGRESS NOTES
Diabetes Self-Management Education Record    Participant Name: Virgil Kang  Referring Provider: Sandrine Pizarro MD  Assessment/Evaluation Ratings:  1=Needs Instruction   4=Demonstrates Understanding/Competency  2=Needs Review   NC=Not Covered    3=Comprehends Key Points  N/A=Not Applicable  Topics/Learning Objectives Pre-session Assess Date:  Instructor initials/date  12/5/23 PC Instruction provided     Yearly follow-up/  reinforcement date. Post- session Eval Comments   Diabetes disease process & Treatment process:   -Define type of diabetes in simple terms.  - Describe the ABCs of  diabetes management  -Identify own type of diabetes  -Identify lifestyle changes/treatment options  -other:  1 [] All     []  []  []  []  []      Developing strategies for Healthy coping/psychosocial issues:    -Describe feelings about living with diabetes  -Identify coping strategies and sources of stress  -Identify support needed & support network available  -Complete PAID-5 Diabetes questionnaire 1 [] All     []  []  []    []   Date:  PAID-5 Score:  Confidence Score:            Prevention, detection & treatment of Chronic complications:    -Identify the prevention, detection and treatment for complications including immunizations, preventive eye, foot, dental and renal exams as indicated per the participant's duration of diabetes and health status.  -Define the natural course of diabetes and the relationship of blood glucose levels to long term complications of diabetes.   1 [] All     []            []      Prevention, detection & treatment of acute complications:    -State the causes,signs & symptoms of hyper & hypoglycemia, and prevention & treatment strategies.   -Describe sick day guidelines  DKA /indications for ketone testing &  when to call physician  1 [] All     []      []                  -Identify severe weather/situation crisis  & diabetes supplies management  []      Using medications safely:   -State effects of

## 2023-12-06 ENCOUNTER — HOSPITAL ENCOUNTER (OUTPATIENT)
Dept: DIABETES SERVICES | Age: 63
Setting detail: THERAPIES SERIES
Discharge: HOME | End: 2023-12-06
Payer: MEDICARE

## 2023-12-06 PROCEDURE — G0109 DIAB MANAGE TRN IND/GROUP: HCPCS

## 2023-12-06 NOTE — PROGRESS NOTES
Diabetes Self-Management Education Record    Participant Name: Jo-Ann Fam  Referring Provider: Juwan Ely MD  Assessment/Evaluation Ratings:  1=Needs Instruction   4=Demonstrates Understanding/Competency  2=Needs Review   NC=Not Covered    3=Comprehends Key Points  N/A=Not Applicable  Topics/Learning Objectives Pre-session Assess Date:  Instructor initials/date  12/5/23 PC  12/6/23 SE Instruction provided  12/5/23 PC  12/6/23 SE Yearly follow-up/  reinforcement date. Post- session Eval Comments   Diabetes disease process & Treatment process:   -Define type of diabetes in simple terms.  - Describe the ABCs of  diabetes management  -Identify own type of diabetes  -Identify lifestyle changes/treatment options  -other:  1 [x] All     []  []  []  []  []  3 12/5/23 PC  Dx in 2002 Type 2 DM  Family hx  No prior DM education   Developing strategies for Healthy coping/psychosocial issues:    -Describe feelings about living with diabetes  -Identify coping strategies and sources of stress  -Identify support needed & support network available  -Complete PAID-5 Diabetes questionnaire 1 [x] All     []  []  []    []  3 Date:12/5/23   PAID-5 Score:9  Confidence Score: 5    Jo-Ann Fam completed a Diabetes Self- Management Education Assessment on 12/5/23. Part of our assessment is having the patient complete the PAID (Problem Areas in Diabetes Scale)-5 survey. This tool  measures diabetes-related emotional distress a patient may be feeling. Jo-Ann Fam scored _9_   A total score of >8 indicates possible diabetes related emotional distress, which warrants further assessment and a referral to mental health professional for psychological support and treatment. Behavioral Health Services information given.    Prevention, detection & treatment of Chronic complications:    -Identify the prevention, detection and treatment for complications including immunizations, preventive eye, foot, dental and renal exams as

## 2023-12-08 NOTE — PROGRESS NOTES
Diabetes Self-Management Education Record    Participant Name: Loco Paulino  Referring Provider: Marilee Castanon MD  Assessment/Evaluation Ratings:  1=Needs Instruction   4=Demonstrates Understanding/Competency  2=Needs Review   NC=Not Covered    3=Comprehends Key Points  N/A=Not Applicable  Topics/Learning Objectives Pre-session Assess Date:  Instructor initials/date  12/5/23 PC  12/6/23 SE Instruction provided  12/5/23 PC  12/6/23 SE Yearly follow-up/  reinforcement date. Post- session Eval Comments   Diabetes disease process & Treatment process:   -Define type of diabetes in simple terms.  - Describe the ABCs of  diabetes management  -Identify own type of diabetes  -Identify lifestyle changes/treatment options  -other:  1 [x] All   12/5/23 PC  []  []  []  []  []  3 12/5/23 PC  Dx in 2002 Type 2 DM  Family hx  No prior DM education   Developing strategies for Healthy coping/psychosocial issues:    -Describe feelings about living with diabetes  -Identify coping strategies and sources of stress  -Identify support needed & support network available  -Complete PAID-5 Diabetes questionnaire 1 [x] All   12/5/23 PC  []  []  []    []  3 Date:12/5/23 PC  PAID-5 Score:9  Confidence Score: 5    Loco Paulino completed a Diabetes Self- Management Education Assessment on 12/5/23. Part of our assessment is having the patient complete the PAID (Problem Areas in Diabetes Scale)-5 survey. This tool  measures diabetes-related emotional distress a patient may be feeling. Loco Paulino scored _9_   A total score of >8 indicates possible diabetes related emotional distress, which warrants further assessment and a referral to mental health professional for psychological support and treatment. Behavioral Health Services information given.    Prevention, detection & treatment of Chronic complications:    -Identify the prevention, detection and treatment for complications including immunizations, preventive eye, foot, dental and

## 2023-12-08 NOTE — PROGRESS NOTES
Diabetes Self-Management Education Record    Participant Name: Jo-Ann Fam  Referring Provider: Juwan Ely MD  Assessment/Evaluation Ratings:  1=Needs Instruction   4=Demonstrates Understanding/Competency  2=Needs Review   NC=Not Covered    3=Comprehends Key Points  N/A=Not Applicable  Topics/Learning Objectives Pre-session Assess Date:  Instructor initials/date  12/5/23 PC  12/6/23 SE Instruction provided  12/5/23 PC  12/6/23 SE Yearly follow-up/  reinforcement date. Post- session Eval Comments   Diabetes disease process & Treatment process:   -Define type of diabetes in simple terms.  - Describe the ABCs of  diabetes management  -Identify own type of diabetes  -Identify lifestyle changes/treatment options  -other:  1 [x] All   12/5/23 PC  []  []  []  []  []  3 12/5/23 PC  Dx in 2002 Type 2 DM  Family hx  No prior DM education   Developing strategies for Healthy coping/psychosocial issues:    -Describe feelings about living with diabetes  -Identify coping strategies and sources of stress  -Identify support needed & support network available  -Complete PAID-5 Diabetes questionnaire 1 [x] All   12/5/23 PC  []  []  []    []  3 Date:12/5/23 PC  PAID-5 Score:9  Confidence Score: 5    Jo-Ann Fam completed a Diabetes Self- Management Education Assessment on 12/5/23. Part of our assessment is having the patient complete the PAID (Problem Areas in Diabetes Scale)-5 survey. This tool  measures diabetes-related emotional distress a patient may be feeling. Jo-Ann Fam scored _9_   A total score of >8 indicates possible diabetes related emotional distress, which warrants further assessment and a referral to mental health professional for psychological support and treatment. Behavioral Health Services information given.    Prevention, detection & treatment of Chronic complications:    -Identify the prevention, detection and treatment for complications including immunizations, preventive eye, foot, dental and

## 2023-12-11 ENCOUNTER — HOSPITAL ENCOUNTER (OUTPATIENT)
Dept: PHYSICAL THERAPY | Age: 63
Setting detail: THERAPIES SERIES
Discharge: HOME | End: 2023-12-11
Attending: INTERNAL MEDICINE
Payer: MEDICARE

## 2023-12-11 PROCEDURE — 97110 THERAPEUTIC EXERCISES: CPT

## 2023-12-11 NOTE — PROGRESS NOTES
Physical Therapy     DISCHARGE NOTE    PATIENT: Jonah Brown   Date: 12/4/2023  DIAGNOSIS:  Gait disorder (R26.9  PHYSICIAN: Deepti Chairez MD  [address] [facility] [address],  [address] [place] [address]     ATTENDANCE:  6  OUT OF 10  APPOINTMENT FROM  9/21/2023 TO 12/04/23  TREATMENTS RECEIVED:   THEREX, GAIT, BALANCE,  MH ,   HEP,      INITIAL PROBLEM CURRENT STATUS        Antalgic gait LLE with SBQC Improved        DECREASED endurance for home tasks Pt reports increased tolerance for home tasks        DECREASED STRENGTH  BLE    LLE  HIP              Flex= 4-/5                Knee    flex = 4/5    / =  4/5  Ankle    pf=      4/5    df=     4/5    RLE  HIP              Flex= 4-/5                Knee    flex = 4/5    / =  4/5  Ankle    pf=      4/5    df=     4/5      LLE  HIP              Flex= 4/5                Knee    flex = 4+/5    / =  4+/5  Ankle    pf=      4+/5    df=     4+/5    RLE  HIP              Flex= 4/5                Knee    flex = 4+/5    / =  4+/5  Ankle    pf=      4+/5    df=     4+/5         COMMENTS/ RECOMMENDATIONS: Pt has improved LE strength and reports improved endurance for functional activities at home. Pt encouraged to continue with HEP. Pt is discharged from PT at this time.       [address] [facility] YOU FOR THE OPPORTUNITY TO WORK WITH WITH THIS PATIENT  Scottie Hall PT            IF YOU HAVE ANY QUESTIONS OR COMMENTS, PLEASE FEEL FREE TO CONTACT ME AT   [address] [facility],[address]  [address]  [address] [place] [address]  FAX : [phone]  PHONE: [phone]

## 2023-12-11 NOTE — PROGRESS NOTES
[address] [facility]  [facility] Therapy Daily Treatment Note  Date: 2023  Patient Name: Virgil Kang  : 1960   MRN: [id number]  DOInjury: na   DOSx: na  Referring Provider: Sandrine Pizarro MD  [address] [facility],[address],  109 [facility] Diagnosis: Gait disorder (R26.9    Outcome Measure:  LEFS= 17 (23)    S: Patient reports having some back pain from seated hip adduction, and abduction exercises at home. Discussed being less aggressive or performing less reps. Patient given handout and we reviewed HEP. Patient states a good understanding. O:   Time 9388-5862 1-2x/week x 6 weeks   Visit  Patient coming 1x/week due to transportation   Pain 0/10    ROM      Modalities     Exercise     LAQ 1# alt. X 2 min HEP   Hamstring Curl  1# Alt. X 1.5 min HEP   calf raises ND due to pain left ball of foot    Hip abd 1# Alt. X 1:25 min, as tolerated HEP   Hip ext 1# Alt. X 1.5 min HEP   March with AW 1# Alt. X 1.5 min HEP   Seated hip abd with resistance Green x 1.5 min HEP   Seated hip add with glute sets  HEP   A:  Tolerated well. See Progress Report for current status. Copy of report to be sent to physician. P: Patient has completed script, will continue with HEP.   Lia Cannon PTA    Treatment Charges: Mins Units   Initial Evaluation     Re-Evaluation     Ther Exercise         TE 23 2   Manual Therapy     MT     Ther Activities        TA     Gait Training          GT     Neuro Re-education NR     Modalities     Non-Billable Service Time     Other     Total Time/Units 23 2

## 2024-01-29 ASSESSMENT — PATIENT HEALTH QUESTIONNAIRE - PHQ9
2. FEELING DOWN, DEPRESSED OR HOPELESS: NOT AT ALL
10. IF YOU CHECKED OFF ANY PROBLEMS, HOW DIFFICULT HAVE THESE PROBLEMS MADE IT FOR YOU TO DO YOUR WORK, TAKE CARE OF THINGS AT HOME, OR GET ALONG WITH OTHER PEOPLE: NOT DIFFICULT AT ALL
3. TROUBLE FALLING OR STAYING ASLEEP: 1
5. POOR APPETITE OR OVEREATING: 0
4. FEELING TIRED OR HAVING LITTLE ENERGY: 1
SUM OF ALL RESPONSES TO PHQ QUESTIONS 1-9: 2
6. FEELING BAD ABOUT YOURSELF - OR THAT YOU ARE A FAILURE OR HAVE LET YOURSELF OR YOUR FAMILY DOWN: NOT AT ALL
SUM OF ALL RESPONSES TO PHQ QUESTIONS 1-9: 2
9. THOUGHTS THAT YOU WOULD BE BETTER OFF DEAD, OR OF HURTING YOURSELF: NOT AT ALL
1. LITTLE INTEREST OR PLEASURE IN DOING THINGS: NOT AT ALL
8. MOVING OR SPEAKING SO SLOWLY THAT OTHER PEOPLE COULD HAVE NOTICED. OR THE OPPOSITE - BEING SO FIDGETY OR RESTLESS THAT YOU HAVE BEEN MOVING AROUND A LOT MORE THAN USUAL: NOT AT ALL
5. POOR APPETITE OR OVEREATING: NOT AT ALL
8. MOVING OR SPEAKING SO SLOWLY THAT OTHER PEOPLE COULD HAVE NOTICED. OR THE OPPOSITE, BEING SO FIGETY OR RESTLESS THAT YOU HAVE BEEN MOVING AROUND A LOT MORE THAN USUAL: 0
SUM OF ALL RESPONSES TO PHQ9 QUESTIONS 1 & 2: 0
2. FEELING DOWN, DEPRESSED OR HOPELESS: 0
7. TROUBLE CONCENTRATING ON THINGS, SUCH AS READING THE NEWSPAPER OR WATCHING TELEVISION: 0
4. FEELING TIRED OR HAVING LITTLE ENERGY: SEVERAL DAYS
3. TROUBLE FALLING OR STAYING ASLEEP: SEVERAL DAYS
1. LITTLE INTEREST OR PLEASURE IN DOING THINGS: 0
7. TROUBLE CONCENTRATING ON THINGS, SUCH AS READING THE NEWSPAPER OR WATCHING TELEVISION: NOT AT ALL
SUM OF ALL RESPONSES TO PHQ QUESTIONS 1-9: 2
6. FEELING BAD ABOUT YOURSELF - OR THAT YOU ARE A FAILURE OR HAVE LET YOURSELF OR YOUR FAMILY DOWN: 0
SUM OF ALL RESPONSES TO PHQ QUESTIONS 1-9: 2
9. THOUGHTS THAT YOU WOULD BE BETTER OFF DEAD, OR OF HURTING YOURSELF: 0
10. IF YOU CHECKED OFF ANY PROBLEMS, HOW DIFFICULT HAVE THESE PROBLEMS MADE IT FOR YOU TO DO YOUR WORK, TAKE CARE OF THINGS AT HOME, OR GET ALONG WITH OTHER PEOPLE: 0

## 2024-01-30 SDOH — HEALTH STABILITY: PHYSICAL HEALTH: ON AVERAGE, HOW MANY MINUTES DO YOU ENGAGE IN EXERCISE AT THIS LEVEL?: 10 MIN

## 2024-01-30 SDOH — HEALTH STABILITY: PHYSICAL HEALTH: ON AVERAGE, HOW MANY DAYS PER WEEK DO YOU ENGAGE IN MODERATE TO STRENUOUS EXERCISE (LIKE A BRISK WALK)?: 7 DAYS

## 2024-01-30 ASSESSMENT — PATIENT HEALTH QUESTIONNAIRE - PHQ9
SUM OF ALL RESPONSES TO PHQ QUESTIONS 1-9: 0
SUM OF ALL RESPONSES TO PHQ QUESTIONS 1-9: 0
2. FEELING DOWN, DEPRESSED OR HOPELESS: 0
SUM OF ALL RESPONSES TO PHQ QUESTIONS 1-9: 0
1. LITTLE INTEREST OR PLEASURE IN DOING THINGS: 0
SUM OF ALL RESPONSES TO PHQ9 QUESTIONS 1 & 2: 0
SUM OF ALL RESPONSES TO PHQ QUESTIONS 1-9: 0

## 2024-01-30 ASSESSMENT — LIFESTYLE VARIABLES
HOW OFTEN DO YOU HAVE SIX OR MORE DRINKS ON ONE OCCASION: 1
HOW OFTEN DO YOU HAVE A DRINK CONTAINING ALCOHOL: MONTHLY OR LESS
HOW OFTEN DO YOU HAVE A DRINK CONTAINING ALCOHOL: 2
HOW MANY STANDARD DRINKS CONTAINING ALCOHOL DO YOU HAVE ON A TYPICAL DAY: 1 OR 2
HOW MANY STANDARD DRINKS CONTAINING ALCOHOL DO YOU HAVE ON A TYPICAL DAY: 1

## 2024-02-01 ENCOUNTER — OFFICE VISIT (OUTPATIENT)
Dept: PRIMARY CARE CLINIC | Age: 64
End: 2024-02-01
Payer: MEDICARE

## 2024-02-01 VITALS
HEART RATE: 81 BPM | HEIGHT: 64 IN | SYSTOLIC BLOOD PRESSURE: 150 MMHG | WEIGHT: 293 LBS | TEMPERATURE: 97.5 F | DIASTOLIC BLOOD PRESSURE: 70 MMHG | BODY MASS INDEX: 50.02 KG/M2 | OXYGEN SATURATION: 96 %

## 2024-02-01 DIAGNOSIS — R26.9 GAIT DISORDER: ICD-10-CM

## 2024-02-01 DIAGNOSIS — I10 ESSENTIAL (PRIMARY) HYPERTENSION: ICD-10-CM

## 2024-02-01 DIAGNOSIS — F33.1 MODERATE EPISODE OF RECURRENT MAJOR DEPRESSIVE DISORDER (HCC): ICD-10-CM

## 2024-02-01 DIAGNOSIS — J30.1 ALLERGIC RHINITIS DUE TO POLLEN, UNSPECIFIED SEASONALITY: ICD-10-CM

## 2024-02-01 DIAGNOSIS — E05.90 HYPERTHYROIDISM: ICD-10-CM

## 2024-02-01 DIAGNOSIS — Z79.4 TYPE 2 DIABETES MELLITUS WITH HYPERGLYCEMIA, WITH LONG-TERM CURRENT USE OF INSULIN (HCC): Primary | ICD-10-CM

## 2024-02-01 DIAGNOSIS — E11.65 TYPE 2 DIABETES MELLITUS WITH HYPERGLYCEMIA, WITH LONG-TERM CURRENT USE OF INSULIN (HCC): Primary | ICD-10-CM

## 2024-02-01 DIAGNOSIS — E78.2 MIXED HYPERLIPIDEMIA: ICD-10-CM

## 2024-02-01 DIAGNOSIS — E03.9 ACQUIRED HYPOTHYROIDISM: ICD-10-CM

## 2024-02-01 DIAGNOSIS — E11.42 DIABETIC POLYNEUROPATHY ASSOCIATED WITH TYPE 2 DIABETES MELLITUS (HCC): ICD-10-CM

## 2024-02-01 PROCEDURE — G8482 FLU IMMUNIZE ORDER/ADMIN: HCPCS | Performed by: INTERNAL MEDICINE

## 2024-02-01 PROCEDURE — 99214 OFFICE O/P EST MOD 30 MIN: CPT | Performed by: INTERNAL MEDICINE

## 2024-02-01 PROCEDURE — G0438 PPPS, INITIAL VISIT: HCPCS | Performed by: INTERNAL MEDICINE

## 2024-02-01 PROCEDURE — 2022F DILAT RTA XM EVC RTNOPTHY: CPT | Performed by: INTERNAL MEDICINE

## 2024-02-01 PROCEDURE — 1036F TOBACCO NON-USER: CPT | Performed by: INTERNAL MEDICINE

## 2024-02-01 PROCEDURE — G8417 CALC BMI ABV UP PARAM F/U: HCPCS | Performed by: INTERNAL MEDICINE

## 2024-02-01 PROCEDURE — 3046F HEMOGLOBIN A1C LEVEL >9.0%: CPT | Performed by: INTERNAL MEDICINE

## 2024-02-01 PROCEDURE — 3078F DIAST BP <80 MM HG: CPT | Performed by: INTERNAL MEDICINE

## 2024-02-01 PROCEDURE — 3017F COLORECTAL CA SCREEN DOC REV: CPT | Performed by: INTERNAL MEDICINE

## 2024-02-01 PROCEDURE — 3077F SYST BP >= 140 MM HG: CPT | Performed by: INTERNAL MEDICINE

## 2024-02-01 PROCEDURE — G8427 DOCREV CUR MEDS BY ELIG CLIN: HCPCS | Performed by: INTERNAL MEDICINE

## 2024-02-01 RX ORDER — VENLAFAXINE 75 MG/1
75 TABLET ORAL DAILY
Qty: 30 TABLET | Refills: 5 | Status: SHIPPED | OUTPATIENT
Start: 2024-02-01

## 2024-02-01 RX ORDER — LOSARTAN POTASSIUM 100 MG/1
100 TABLET ORAL DAILY
Qty: 30 TABLET | Refills: 5 | Status: SHIPPED | OUTPATIENT
Start: 2024-02-01

## 2024-02-01 RX ORDER — INSULIN GLARGINE 100 [IU]/ML
35 INJECTION, SOLUTION SUBCUTANEOUS 2 TIMES DAILY
Qty: 7 ADJUSTABLE DOSE PRE-FILLED PEN SYRINGE | Refills: 5 | Status: SHIPPED | OUTPATIENT
Start: 2024-02-01

## 2024-02-01 RX ORDER — MONTELUKAST SODIUM 10 MG/1
10 TABLET ORAL DAILY
Qty: 30 TABLET | Refills: 5 | Status: SHIPPED | OUTPATIENT
Start: 2024-02-01

## 2024-02-01 RX ORDER — AMLODIPINE BESYLATE 5 MG/1
5 TABLET ORAL DAILY
Qty: 30 TABLET | Refills: 5 | Status: SHIPPED | OUTPATIENT
Start: 2024-02-01

## 2024-02-01 RX ORDER — FUROSEMIDE 20 MG/1
20 TABLET ORAL DAILY
Qty: 30 TABLET | Refills: 5 | Status: SHIPPED | OUTPATIENT
Start: 2024-02-01

## 2024-02-01 RX ORDER — FLUTICASONE PROPIONATE 50 MCG
SPRAY, SUSPENSION (ML) NASAL
Qty: 16 G | Refills: 3 | Status: SHIPPED | OUTPATIENT
Start: 2024-02-01

## 2024-02-01 RX ORDER — LEVOTHYROXINE SODIUM 88 UG/1
88 TABLET ORAL DAILY
Qty: 90 TABLET | Refills: 1 | Status: SHIPPED | OUTPATIENT
Start: 2024-02-01 | End: 2024-07-30

## 2024-02-01 RX ORDER — INSULIN ASPART 100 [IU]/ML
INJECTION, SOLUTION INTRAVENOUS; SUBCUTANEOUS
Qty: 15 ML | Refills: 10 | Status: SHIPPED | OUTPATIENT
Start: 2024-02-01

## 2024-02-01 NOTE — PROGRESS NOTES
Nimisha Gordon (:  1960) is a 63 y.o. female,Established patient, here for evaluation of the following chief complaint(s):  Medicare AWV      Subjective   SUBJECTIVE/OBJECTIVE:  HPI  Hypertension:  Patient is here for follow up chronic hypertension.  This is  generally controlled on current medication regimen.    BP Readings from Last 1 Encounters:   24 (!) 150/70        Takes meds as directed and tolerates them well.  Most recent labs reviewed with patient and are not remarkable.  No symptoms from htn standpoint per ROS.  Patient is  compliant with lifestyle modifications.  Patient does not smoke.  Comorbid conditions include obesity   DM2:   Patient is here to fu regarding DM2. Patient is  controlled.  Taking all medications and tolerating well.  Currently on insulin.  Fasting sugars are running 1 80-2.  Patient is taking ASA .  No hypoglycemic episodes.  Saw an Eye Dr . Patient is aware that it is necessary to see an Eye Dr yearly.  Patient does not smoke.  Most recent labs reviewed with patient.  Patient does not have complaints or concerns today. Patient checks blood sugar 2 per day   Hypothyroidism:  Patient is here today to follow up chronic hypothyroidism.  This is   generally controlled on current medication regimen.  Takes medication as directed and tolerates well.  Symptoms from thyroid standpoint include none.  Most recent labs reviewed with patient and are not remarkable.  Thyroid function in particular is  controlled.     Depression:  Patient is here with complaints of depression.  This is a chronic problem.  This has been going on for years.  Exacerbating factors include stress.  Alleviating factors include Effexor depressive symptoms include none at this visit.  Patient does not have suicidal or homicidal ideation.  Patient is not having issues falling or staying asleep.  Patient is not having associated panic attacks.  The above is not interfering with quality of life.    Patient does 
change her behavior, the following plan was agreed upon to work toward lowering cardiovascular disease risk: low saturated fat, low cholesterol diet.  Aspirin use for primary prevention of cardiovascular disease for men 45-79 and women 55-79: Indicated- continue daily aspirin. Educational materials for lifestyle changes were provided. Patient will follow-up in 3 month(s) with PCP. Provider spent 30 minutes counseling patient.            Objective   Vitals:    02/01/24 1137 02/01/24 1146   BP: (!) 150/78 (!) 150/70   Pulse: 81    Temp: 97.5 °F (36.4 °C)    TempSrc: Temporal    SpO2: 96%    Weight: (!) 198.7 kg (438 lb)    Height: 1.626 m (5' 4\")       Body mass index is 75.18 kg/m².             Allergies   Allergen Reactions    Asa [Aspirin]      sensitivity    Codeine      Vomiting not sure if caused by codeine    Other      Sutures   Body rejecting sutures-- incisions open up    Oxycodone     Tape [Adhesive Tape] Other (See Comments)     Peels skin off     Prior to Visit Medications    Medication Sig Taking? Authorizing Provider   amLODIPine (NORVASC) 5 MG tablet Take 1 tablet by mouth daily Yes Maverick Davies MD   BASAGLAR KWIKPEN 100 UNIT/ML injection pen Inject 35 Units into the skin 2 times daily Yes Maverick Davies MD   fluticasone (FLONASE) 50 MCG/ACT nasal spray INSTILL 1 SPRAY INTRANASALLY DAILY Yes Maverick Davies MD   furosemide (LASIX) 20 MG tablet Take 1 tablet by mouth daily Yes Maverick Davies MD   insulin aspart (NOVOLOG FLEXPEN) 100 UNIT/ML injection pen INJECT SUBCUTANEOUSLY PER SLIDING SCALE <100=0 UNITS 100- 150=4U 151-200=6U 201-250=8U 251 -300=10U >300=12U CALL OFFICE >500 *MAX DAILY DOSE: 36 UNITS* Yes Maverick Davies MD   levothyroxine (SYNTHROID) 88 MCG tablet Take 1 tablet by mouth Daily Yes Maverick Davies MD   losartan (COZAAR) 100 MG tablet Take 1 tablet by mouth daily Yes Maverick Davies MD   montelukast (SINGULAIR) 10 MG tablet Take 1 tablet by mouth daily Yes Keke

## 2024-02-01 NOTE — PATIENT INSTRUCTIONS
the bathroom with you.   Where can you learn more?  Go to https://www.GoGuide.net/patientEd and enter G117 to learn more about \"Preventing Falls: Care Instructions.\"  Current as of: July 18, 2023               Content Version: 13.9  © 0389-0692 [employer]   Care instructions adapted under license by [employer]. If you have questions about a medical condition or this instruction, always ask your healthcare professional. [employer] disclaims any warranty or liability for your use of this information.           Learning About Activities of Daily Living  What are activities of daily living?     Activities of daily living (ADLs) are the basic self-care tasks you do every day. These include eating, bathing, dressing, and moving around.  As you age, and if you have health problems, you may find that it's harder to do some of these tasks. If so, your doctor can suggest ideas that may help.  To measure what kind of help you may need, your doctor will ask how well you are able to do ADLs. Let your doctor know if there are any tasks that you are having trouble doing. This is an important first step to getting help. And when you have the help you need, you can stay as independent as possible.  How will a doctor assess your ADLs?  Asking about ADLs is part of a routine health checkup your doctor will likely do as you age. Your health check might be done in a doctor's office, in your home, or at a hospital. The goal is to find out if you are having any problems that could make it hard to care for yourself or that make it unsafe for you to be on your own.  To measure your ADLs, your doctor will ask how hard it is for you to do routine tasks. Your doctor may also want to know if you have changed the way you do a task because of a health problem. Your doctor may watch how you:  Walk back and forth.  Keep your balance while you stand or walk.  Move from sitting to standing or from a bed to a

## 2024-02-23 DIAGNOSIS — M83.9 VITAMIN D DEFICIENT OSTEOMALACIA: ICD-10-CM

## 2024-02-26 RX ORDER — ERGOCALCIFEROL 1.25 MG/1
50000 CAPSULE ORAL WEEKLY
Qty: 12 CAPSULE | Refills: 1 | Status: SHIPPED | OUTPATIENT
Start: 2024-02-26

## 2024-04-09 ENCOUNTER — OFFICE VISIT (OUTPATIENT)
Dept: PRIMARY CARE CLINIC | Age: 64
End: 2024-04-09
Payer: MEDICARE

## 2024-04-09 VITALS
DIASTOLIC BLOOD PRESSURE: 64 MMHG | HEIGHT: 64 IN | TEMPERATURE: 98 F | OXYGEN SATURATION: 95 % | WEIGHT: 293 LBS | BODY MASS INDEX: 50.02 KG/M2 | SYSTOLIC BLOOD PRESSURE: 128 MMHG | HEART RATE: 69 BPM

## 2024-04-09 DIAGNOSIS — L02.211 ABDOMINAL WALL ABSCESS: Primary | ICD-10-CM

## 2024-04-09 DIAGNOSIS — L02.211 ABDOMINAL WALL ABSCESS: ICD-10-CM

## 2024-04-09 PROCEDURE — 3017F COLORECTAL CA SCREEN DOC REV: CPT | Performed by: INTERNAL MEDICINE

## 2024-04-09 PROCEDURE — G8417 CALC BMI ABV UP PARAM F/U: HCPCS | Performed by: INTERNAL MEDICINE

## 2024-04-09 PROCEDURE — 3078F DIAST BP <80 MM HG: CPT | Performed by: INTERNAL MEDICINE

## 2024-04-09 PROCEDURE — 99213 OFFICE O/P EST LOW 20 MIN: CPT | Performed by: INTERNAL MEDICINE

## 2024-04-09 PROCEDURE — G8427 DOCREV CUR MEDS BY ELIG CLIN: HCPCS | Performed by: INTERNAL MEDICINE

## 2024-04-09 PROCEDURE — 1036F TOBACCO NON-USER: CPT | Performed by: INTERNAL MEDICINE

## 2024-04-09 PROCEDURE — 3074F SYST BP LT 130 MM HG: CPT | Performed by: INTERNAL MEDICINE

## 2024-04-09 RX ORDER — CEPHALEXIN 500 MG/1
500 CAPSULE ORAL 4 TIMES DAILY
Qty: 40 CAPSULE | Refills: 0 | Status: SHIPPED | OUTPATIENT
Start: 2024-04-09 | End: 2024-04-19

## 2024-04-09 NOTE — PROGRESS NOTES
Health Maintenance Due   Topic Date Due    Diabetic foot exam  Never done    DTaP/Tdap/Td vaccine (1 - Tdap) Never done    Cervical cancer screen  Never done    Shingles vaccine (1 of 2) Never done    Pneumococcal 0-64 years Vaccine (2 of 2 - PCV) 03/02/2016    Respiratory Syncytial Virus (RSV) Pregnant or age 60 yrs+ (1 - 1-dose 60+ series) Never done    Diabetic Alb to Cr ratio (uACR) test  11/04/2023    lski    Physical Exam  Constitutional:       General: She is not in acute distress.     Appearance: Normal appearance. She is obese.   HENT:      Head: Normocephalic and atraumatic.      Right Ear: Tympanic membrane, ear canal and external ear normal.      Left Ear: Tympanic membrane, ear canal and external ear normal. There is no impacted cerumen.      Nose: Nose normal. No congestion or rhinorrhea.      Mouth/Throat:      Mouth: Mucous membranes are moist.      Pharynx: Oropharynx is clear. No oropharyngeal exudate or posterior oropharyngeal erythema.   Eyes:      General: No scleral icterus.        Right eye: No discharge.         Left eye: No discharge.      Pupils: Pupils are equal, round, and reactive to light.   Neck:      Vascular: No carotid bruit.   Cardiovascular:      Rate and Rhythm: Normal rate and regular rhythm.      Pulses: Normal pulses.      Heart sounds: No murmur heard.     No gallop.   Pulmonary:      Effort: Pulmonary effort is normal. No respiratory distress.      Breath sounds: Normal breath sounds. No wheezing, rhonchi or rales.   Chest:      Chest wall: No tenderness.   Abdominal:      General: Bowel sounds are normal.      Palpations: Abdomen is soft. There is no mass.      Tenderness: There is no abdominal tenderness. There is no guarding.      Hernia: No hernia is present.   Musculoskeletal:         General: No swelling, tenderness, deformity or signs of injury.      Cervical back: Normal range of motion and neck supple. No rigidity or tenderness.      Right lower leg: No edema.

## 2024-04-10 DIAGNOSIS — L02.211 ABDOMINAL WALL ABSCESS: ICD-10-CM

## 2024-04-12 LAB
CULTURE: ABNORMAL
DIRECT EXAM: ABNORMAL
SPECIMEN DESCRIPTION: ABNORMAL

## 2024-04-12 RX ORDER — CEPHALEXIN 500 MG/1
CAPSULE ORAL
Qty: 40 CAPSULE | Refills: 10 | OUTPATIENT
Start: 2024-04-12

## 2024-04-17 NOTE — PROGRESS NOTES
201-250=8U 251 -300=10U >300=12U CALL OFFICE >500 *MAX DAILY DOSE: 36 UNITS*, Disp-15 mL, R-10Normal  -     Hemoglobin A1C; Future  4. Allergic rhinitis due to pollen, unspecified seasonality  -     fluticasone (FLONASE) 50 MCG/ACT nasal spray; INSTILL 1 SPRAY INTRANASALLY DAILY, Disp-16 g, R-3Normal  -     montelukast (SINGULAIR) 10 MG tablet; Take 1 tablet by mouth daily, Disp-30 tablet, R-5Normal  5. Hyperthyroidism  -     levothyroxine (SYNTHROID) 88 MCG tablet; Take 1 tablet by mouth Daily, Disp-90 tablet, R-1Normal  -     T4, Free; Future  -     TSH; Future  6. Moderate episode of recurrent major depressive disorder (HCC)  -     venlafaxine (EFFEXOR) 75 MG tablet; Take 1 tablet by mouth daily, Disp-30 tablet, R-5Normal  7. Vitamin D deficient osteomalacia  -     vitamin D (ERGOCALCIFEROL) 1.25 MG (66196 UT) CAPS capsule; Take 1 capsule by mouth Once a week at 5 PM, Disp-12 capsule, R-1Normal      Return in about 3 months (around 8/17/2024) for follow up, lab.       An electronic signature was used to authenticate this note.    --Maverick Davies MD

## 2024-04-18 ENCOUNTER — INITIAL CONSULT (OUTPATIENT)
Dept: SURGERY | Age: 64
End: 2024-04-18
Payer: MEDICARE

## 2024-04-18 VITALS — HEART RATE: 67 BPM | DIASTOLIC BLOOD PRESSURE: 70 MMHG | TEMPERATURE: 97.8 F | SYSTOLIC BLOOD PRESSURE: 154 MMHG

## 2024-04-18 DIAGNOSIS — L02.91 ABSCESS: Primary | ICD-10-CM

## 2024-04-18 PROCEDURE — G8417 CALC BMI ABV UP PARAM F/U: HCPCS | Performed by: SURGERY

## 2024-04-18 PROCEDURE — 3017F COLORECTAL CA SCREEN DOC REV: CPT | Performed by: SURGERY

## 2024-04-18 PROCEDURE — G8427 DOCREV CUR MEDS BY ELIG CLIN: HCPCS | Performed by: SURGERY

## 2024-04-18 PROCEDURE — 3077F SYST BP >= 140 MM HG: CPT | Performed by: SURGERY

## 2024-04-18 PROCEDURE — 1036F TOBACCO NON-USER: CPT | Performed by: SURGERY

## 2024-04-18 PROCEDURE — 99204 OFFICE O/P NEW MOD 45 MIN: CPT | Performed by: SURGERY

## 2024-04-18 PROCEDURE — 3078F DIAST BP <80 MM HG: CPT | Performed by: SURGERY

## 2024-04-18 NOTE — PROGRESS NOTES
General Surgery Consult    Patient's Name/Date of Birth: Nimisha Gordon / 1960    Date: April 18, 2024     Consulting Surgeon: Truong Saldana M.D.    PCP: Maverick Davies MD     Chief Complaint: abscess    HPI:   Nimisha Gordon is a 63 y.o. female who presents for  evaluation of abscess of abd wa;;. No other symptoms complaints and is  on antibiotics. Has had previous episodes of abscess.      Past Medical History:   Diagnosis Date    Arthritis     hands    History of blood transfusion     Hypertension     no meds    Hypothyroidism     Sleep apnea     Surgical wound, non healing     Type 2 diabetes mellitus without complication (HCC)     Type II or unspecified type diabetes mellitus without mention of complication, not stated as uncontrolled     diet controlled       Past Surgical History:   Procedure Laterality Date    ABDOMEN SURGERY  10/23/2015     excisional debridement abdominal wound    APPENDECTOMY      CARDIAC SURGERY      negative    CHOLECYSTECTOMY      COLON SURGERY      intestinal, stomach (part)    OTHER SURGICAL HISTORY  4/6/16    abd wound debridement    MT EXC B9 LESION MRGN XCP SK TG T/A/L 0.5 CM/< N/A 5/2/2018    EXCISION ABDOMINAL WALL WOUND performed by Truong Saldana MD at [facility] OR    TONSILLECTOMY      TUBAL LIGATION      UPPER GASTROINTESTINAL ENDOSCOPY      XR MIDLINE EQUAL OR GREATER THAN 5 YEARS  5/6/2015    XR MIDLINE EQUAL OR GREATER THAN 5 YEARS       Current Outpatient Medications   Medication Sig Dispense Refill    cephALEXin (KEFLEX) 500 MG capsule Take 1 capsule by mouth 4 times daily for 10 days 40 capsule 0    vitamin D (ERGOCALCIFEROL) 1.25 MG (98003 UT) CAPS capsule TAKE 1 CAPSULE BY MOUTH ONCE WEEKLY 12 capsule 1    amLODIPine (NORVASC) 5 MG tablet Take 1 tablet by mouth daily 30 tablet 5    BASAGLAR KWIKPEN 100 UNIT/ML injection pen Inject 35 Units into the skin 2 times daily 7 Adjustable Dose Pre-filled Pen Syringe 5    fluticasone (FLONASE) 50 MCG/ACT nasal spray INSTILL 1

## 2024-04-22 DIAGNOSIS — L02.91 ABSCESS: Primary | ICD-10-CM

## 2024-05-01 ENCOUNTER — TELEPHONE (OUTPATIENT)
Dept: PRIMARY CARE CLINIC | Age: 64
End: 2024-05-01

## 2024-05-02 NOTE — TELEPHONE ENCOUNTER
Sports Medicine Follow-up Note      Chief Complaint   Patient presents with   • Left Knee - Pain, Follow-up   • Right Knee - Pain, Follow-up       HPI: Dominick is a 62 year old male  Here for follow up b/l vascular insufficiency - s/p b/l vascular surgery; significantly improved s/p surgery. Patient reports he is feeling better now. Now patient reports b/l knee pain - worse with both knees and after playing tennis. No numbness or tingling. Patient reports he has had steroid injections into the knees in past with significant relief - last injection greater than 6 months ago. Reports left foot stress fracture is completely healed. No hx of viscosupplementation. No back or ankle pain. No locking or new trauma. XR of right knee in January and b/l January 2017 - mild joint space narrowing in medial compartment.     Prior HPI:  A 61 yo M pmhx afib on coumadin, HTN, bipolar disorder, here today for left leg swelling/pain.     - patient was last seen on 6/18. He was advised to wean off the boot and to start PT prior visit. Finished PT as well. Told his balance and coordination is poor.    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 Went back to podiatry who was concerned about to have  -Now done wearing boot and saw podiatrist again who was concerned about vascular issues.   -Vasc ext lowr venous reflux study showing: No finding of DVT. R. & L. b/l common femoral veins, popliteal, saphneous veins with incompetence.  -MATTHEW b/l were wnl   -continued swelling and discomfort on Left side only. Using compression stockings which are helping.  -hoping to get a name of a vascular physician referral.   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  -taking ibuprofen 3-4 250mg ibuprofen daily. Has been alternating with the acetaminophen but does not work for him.   -denies numbness/tingling/SOB, CP      A 61 yo M pmhx afib on coumadin, HTN, bipolar disorder, here today for left  Was addended to the progress note leg swelling/pain.     - patient was last seen on 6/18. He was advised to wean off the boot and to start PT prior visit. Finished PT as well. Told his balance and coordination is poor.    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 Went back to podiatry who was concerned about to have  -Now done wearing boot and saw podiatrist again who was concerned about vascular issues.   -Vasc ext lowr venous reflux study showing: No finding of DVT. R. & L. b/l common femoral veins, popliteal, saphneous veins with incompetence.  -MATTHEW b/l were wnl   -continued swelling and discomfort on Left side only. Using compression stockings which are helping.  -hoping to get a name of a vascular physician referral.   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  -taking ibuprofen 3-4 250mg ibuprofen daily. Has been alternating with the acetaminophen but does not work for him.   -denies numbness/tingling/SOB, CP            Review of Systems   Constitutional: Negative.    HENT: Negative.    Eyes: Negative.    Respiratory: Negative.    Cardiovascular: Negative.    Gastrointestinal: Negative.    Endocrine: Negative.    Genitourinary: Negative.    Skin: Negative.    Allergic/Immunologic: Negative.    Neurological: Negative.    Hematological: Negative.    Psychiatric/Behavioral: Negative for self-injury and suicidal ideas.       Past Medical History:   Diagnosis Date   • A-fib (CMS/[facility])    • Bipolar 1 disorder (CMS/[facility])    • HTN (hypertension)    • Venous insufficiency        Past Surgical History:   Procedure Laterality Date   • Back surgery         Family History   Problem Relation Age of Onset   • Heart disease Father      No immediate family members with RA, SLE, gout    Social History     Tobacco Use   • Smoking status: Former Smoker   • Smokeless tobacco: Never Used   Substance Use Topics   • Alcohol use: Not on file        Occupation:      ALLERGIES:  Allergies not on  file    Current Outpatient Medications   Medication Sig Dispense Refill   • metoPROLOL succinate (TOPROL-XL) 50 MG 24 hr tablet TAKE 1 TABLET BY MOUTH EVERY DAY 90 tablet 0   • famotidine (PEPCID) 40 MG tablet TAKE 1 TABLET BY MOUTH EVERY DAY 90 tablet 0   • warfarin (COUMADIN) 5 MG tablet TAKE 1 OR 1 AND 1/2 TABLETS(7.5 MG) BY MOUTH MONDAY, WEDNESDAY, FRIDAY-- 1 TABLET(5 MG) OTHER DAYS AS DIRECTED 120 tablet 0     No current facility-administered medications for this visit.          Physical Exam   Constitutional: He is oriented to person, place, and time. He appears well-developed and well-nourished.   HENT:   Head: Normocephalic and atraumatic.   Eyes: EOM are normal.   Cardiovascular: Intact distal pulses.   Pulmonary/Chest: Effort normal.   Neurological: He is alert and oriented to person, place, and time.   Skin: Skin is warm.   Psychiatric: He has a normal mood and affect.   Vitals reviewed.    Ortho Exam  B/l knee full ROM  No joint effusion  No joint line tenderness  No ligamentous laxity  Negative mcmurrays  Mild patellar grinding    B/L knees were cleaned with ethanol x3.  Initial anesthesia was achieved with ethylene chloride spray. using sterile technique we anesthetized the right and left knee on the lateral side with a infrapatellar approach using approximately 2 mL of 0.5% lidocaine on each side.  We then inserted a 1.5 inch 20-gauge needle and injected 1 mL of 40 mg/mL Kenalog, total of 40 mg used on each side, (ndc #[id number] 3-20, lot number [id number]  expiring February 2020), and 2 mL of 0.5 mg/ml lidocaine, through the same needle per side.  The patient had immediate relief.  There was no bleeding.  Wound was dressed with a Band-Aid.    Imaging:  XR b/l Knee reviewed January 2017 and right knee January 2018    Assessment & Plan:   Problem List Items Addressed This Visit        Musculoskeletal    Primary osteoarthritis of both knees - Primary     B/l knee OA  - XR reviewed  - consider  viscosupplementation; order/referral placed to insurance  - b/l knee steroid injections performed today with good relief  - all questions answered  - home exercises reviewed    Abhijit Maldonado D.O.  Sports Medicine Fellow           Relevant Orders    PREAUTHORIZATION MAY HAVE BILLING CONSENT      Other Visit Diagnoses     Bipolar affective disorder, remission status unspecified (CMS/[facility])        Essential (primary) hypertension               Complicated by HTN, which limits nsaid use  Complicated by hydrarthrosis      I saw and evaluated the patient with the resident or fellow. I agree with the physical exam, assessment and plan as documented herein.     PFS

## 2024-05-10 ENCOUNTER — HOSPITAL ENCOUNTER (OUTPATIENT)
Age: 64
Discharge: HOME | End: 2024-05-10
Attending: SURGERY
Payer: MEDICARE

## 2024-05-10 ENCOUNTER — HOSPITAL ENCOUNTER (OUTPATIENT)
Dept: CT IMAGING | Age: 64
Discharge: HOME | End: 2024-05-10
Attending: SURGERY
Payer: MEDICARE

## 2024-05-10 DIAGNOSIS — L02.91 ABSCESS: ICD-10-CM

## 2024-05-10 DIAGNOSIS — Z79.4 TYPE 2 DIABETES MELLITUS WITH HYPERGLYCEMIA, WITH LONG-TERM CURRENT USE OF INSULIN (HCC): ICD-10-CM

## 2024-05-10 DIAGNOSIS — E05.90 HYPERTHYROIDISM: ICD-10-CM

## 2024-05-10 DIAGNOSIS — I10 ESSENTIAL (PRIMARY) HYPERTENSION: ICD-10-CM

## 2024-05-10 DIAGNOSIS — E11.42 DIABETIC POLYNEUROPATHY ASSOCIATED WITH TYPE 2 DIABETES MELLITUS (HCC): ICD-10-CM

## 2024-05-10 DIAGNOSIS — E78.2 MIXED HYPERLIPIDEMIA: ICD-10-CM

## 2024-05-10 DIAGNOSIS — E11.65 TYPE 2 DIABETES MELLITUS WITH HYPERGLYCEMIA, WITH LONG-TERM CURRENT USE OF INSULIN (HCC): ICD-10-CM

## 2024-05-10 LAB
ALBUMIN SERPL-MCNC: 3.8 G/DL (ref 3.5–5.2)
ALP SERPL-CCNC: 104 U/L (ref 35–104)
ALT SERPL-CCNC: 14 U/L (ref 0–32)
ANION GAP SERPL CALCULATED.3IONS-SCNC: 10 MMOL/L (ref 7–16)
AST SERPL-CCNC: 12 U/L (ref 0–31)
BASOPHILS # BLD: 0.02 K/UL (ref 0–0.2)
BASOPHILS NFR BLD: 0 % (ref 0–2)
BILIRUB SERPL-MCNC: 0.4 MG/DL (ref 0–1.2)
BUN SERPL-MCNC: 26 MG/DL (ref 6–23)
CALCIUM SERPL-MCNC: 9.1 MG/DL (ref 8.6–10.2)
CHLORIDE SERPL-SCNC: 100 MMOL/L (ref 98–107)
CHOLEST SERPL-MCNC: 136 MG/DL
CO2 SERPL-SCNC: 31 MMOL/L (ref 22–29)
CREAT SERPL-MCNC: 0.9 MG/DL (ref 0.5–1)
CREAT UR-MCNC: 122.8 MG/DL (ref 29–226)
EOSINOPHIL # BLD: 0.15 K/UL (ref 0.05–0.5)
EOSINOPHILS RELATIVE PERCENT: 2 % (ref 0–6)
ERYTHROCYTE [DISTWIDTH] IN BLOOD BY AUTOMATED COUNT: 15.9 % (ref 11.5–15)
GFR, ESTIMATED: 73 ML/MIN/1.73M2
GLUCOSE SERPL-MCNC: 208 MG/DL (ref 74–99)
HBA1C MFR BLD: 7.4 % (ref 4–5.6)
HCT VFR BLD AUTO: 38.3 % (ref 34–48)
HDLC SERPL-MCNC: 46 MG/DL
HGB BLD-MCNC: 11.9 G/DL (ref 11.5–15.5)
IMM GRANULOCYTES # BLD AUTO: 0.03 K/UL (ref 0–0.58)
IMM GRANULOCYTES NFR BLD: 0 % (ref 0–5)
LDLC SERPL CALC-MCNC: 69 MG/DL
LYMPHOCYTES NFR BLD: 1.37 K/UL (ref 1.5–4)
LYMPHOCYTES RELATIVE PERCENT: 16 % (ref 20–42)
MCH RBC QN AUTO: 26.7 PG (ref 26–35)
MCHC RBC AUTO-ENTMCNC: 31.1 G/DL (ref 32–34.5)
MCV RBC AUTO: 86.1 FL (ref 80–99.9)
MICROALBUMIN UR-MCNC: 18 MG/L (ref 0–19)
MICROALBUMIN/CREAT UR-RTO: 14 MCG/MG CREAT (ref 0–30)
MONOCYTES NFR BLD: 0.48 K/UL (ref 0.1–0.95)
MONOCYTES NFR BLD: 6 % (ref 2–12)
NEUTROPHILS NFR BLD: 76 % (ref 43–80)
NEUTS SEG NFR BLD: 6.33 K/UL (ref 1.8–7.3)
PLATELET # BLD AUTO: 233 K/UL (ref 130–450)
PMV BLD AUTO: 11.4 FL (ref 7–12)
POTASSIUM SERPL-SCNC: 4 MMOL/L (ref 3.5–5)
PROT SERPL-MCNC: 6.9 G/DL (ref 6.4–8.3)
RBC # BLD AUTO: 4.45 M/UL (ref 3.5–5.5)
SODIUM SERPL-SCNC: 141 MMOL/L (ref 132–146)
T4 FREE SERPL-MCNC: 1.7 NG/DL (ref 0.9–1.7)
TRIGL SERPL-MCNC: 105 MG/DL
TSH SERPL DL<=0.05 MIU/L-ACNC: 4.74 UIU/ML (ref 0.27–4.2)
VLDLC SERPL CALC-MCNC: 21 MG/DL
WBC OTHER # BLD: 8.4 K/UL (ref 4.5–11.5)

## 2024-05-10 PROCEDURE — 6360000004 HC RX CONTRAST MEDICATION: Performed by: RADIOLOGY

## 2024-05-10 PROCEDURE — 85025 COMPLETE CBC W/AUTO DIFF WBC: CPT

## 2024-05-10 PROCEDURE — 82043 UR ALBUMIN QUANTITATIVE: CPT

## 2024-05-10 PROCEDURE — 80053 COMPREHEN METABOLIC PANEL: CPT

## 2024-05-10 PROCEDURE — 74177 CT ABD & PELVIS W/CONTRAST: CPT

## 2024-05-10 PROCEDURE — 83036 HEMOGLOBIN GLYCOSYLATED A1C: CPT

## 2024-05-10 PROCEDURE — 80061 LIPID PANEL: CPT

## 2024-05-10 PROCEDURE — 84443 ASSAY THYROID STIM HORMONE: CPT

## 2024-05-10 PROCEDURE — 82570 ASSAY OF URINE CREATININE: CPT

## 2024-05-10 PROCEDURE — 84439 ASSAY OF FREE THYROXINE: CPT

## 2024-05-10 RX ADMIN — IOPAMIDOL 18 ML: 755 INJECTION, SOLUTION INTRAVENOUS at 10:46

## 2024-05-10 RX ADMIN — IOPAMIDOL 75 ML: 755 INJECTION, SOLUTION INTRAVENOUS at 10:46

## 2024-05-13 ENCOUNTER — OFFICE VISIT (OUTPATIENT)
Dept: SURGERY | Age: 64
End: 2024-05-13
Payer: MEDICARE

## 2024-05-13 VITALS — HEART RATE: 67 BPM | TEMPERATURE: 98.1 F | SYSTOLIC BLOOD PRESSURE: 138 MMHG | DIASTOLIC BLOOD PRESSURE: 61 MMHG

## 2024-05-13 DIAGNOSIS — S30.811D ABRASION OF ABDOMINAL WALL, SUBSEQUENT ENCOUNTER: Primary | ICD-10-CM

## 2024-05-13 PROCEDURE — G8427 DOCREV CUR MEDS BY ELIG CLIN: HCPCS | Performed by: SURGERY

## 2024-05-13 PROCEDURE — 99212 OFFICE O/P EST SF 10 MIN: CPT | Performed by: SURGERY

## 2024-05-13 PROCEDURE — 1036F TOBACCO NON-USER: CPT | Performed by: SURGERY

## 2024-05-13 PROCEDURE — 3017F COLORECTAL CA SCREEN DOC REV: CPT | Performed by: SURGERY

## 2024-05-13 PROCEDURE — 3078F DIAST BP <80 MM HG: CPT | Performed by: SURGERY

## 2024-05-13 PROCEDURE — G8417 CALC BMI ABV UP PARAM F/U: HCPCS | Performed by: SURGERY

## 2024-05-13 PROCEDURE — 3075F SYST BP GE 130 - 139MM HG: CPT | Performed by: SURGERY

## 2024-05-13 RX ORDER — SULFAMETHOXAZOLE AND TRIMETHOPRIM 800; 160 MG/1; MG/1
1 TABLET ORAL 2 TIMES DAILY
Qty: 20 TABLET | Refills: 0 | Status: SHIPPED | OUTPATIENT
Start: 2024-05-13 | End: 2024-05-23

## 2024-05-13 NOTE — PROGRESS NOTES
Surgery Progress Note            Chief complaint:   Chief Complaint   Patient presents with    Results    Abdominal Pain    Nausea      Patient Active Problem List   Diagnosis    Type 2 diabetes mellitus with hyperglycemia, with long-term current use of insulin ([facility])    Body mass index (BMI) 70 or greater, adult (HCC)    Mixed hyperlipidemia    Essential (primary) hypertension    Moderate episode of recurrent major depressive disorder ([facility])    Diabetic polyneuropathy associated with type 2 diabetes mellitus ([facility])    Vitamin D deficient osteomalacia    Gait disorder       S: no acute changes    O:   Vitals:    05/13/24 1310   BP: 138/61   Pulse: 67   Temp: 98.1 °F (36.7 °C)     No intake or output data in the 24 hours ending 05/13/24 1321        Labs:  Lab Results   Component Value Date/Time    WBC 8.4 05/10/2024 08:59 AM    WBC 10.3 09/14/2023 12:04 PM    WBC 11.2 04/20/2023 11:09 AM    HGB 11.9 05/10/2024 08:59 AM    HGB 11.8 09/14/2023 12:04 PM    HGB 12.6 04/20/2023 11:09 AM    HCT 38.3 05/10/2024 08:59 AM    HCT 38.0 09/14/2023 12:04 PM    HCT 42.1 04/20/2023 11:09 AM     Lab Results   Component Value Date    CREATININE 0.9 05/10/2024    BUN 26 (H) 05/10/2024     05/10/2024    K 4.0 05/10/2024     05/10/2024    CO2 31 (H) 05/10/2024     No results found for: \"LIPASE\", \"AMYLASE\"      Physical exam:   /61   Pulse 67   Temp 98.1 °F (36.7 °C) (Temporal)   General appearance: NAD  Head: NCAT  Neck: supple, no masses  Lungs: equal chest rise bilateral  Heart: S1S2 present  Abdomen: soft, nontender, nondistended,  wound on abdominal wall  Skin; no lesions  Gu: no cva tenderness  Extremities: extremities normal, atraumatic, no cyanosis or edema    A:  abdominal wall wound     P: will give abx, wound care products, send to [facility] center referral    Truong Saldana MD  5/13/2024

## 2024-05-14 SDOH — ECONOMIC STABILITY: FOOD INSECURITY: WITHIN THE PAST 12 MONTHS, THE FOOD YOU BOUGHT JUST DIDN'T LAST AND YOU DIDN'T HAVE MONEY TO GET MORE.: NEVER TRUE

## 2024-05-14 SDOH — ECONOMIC STABILITY: TRANSPORTATION INSECURITY
IN THE PAST 12 MONTHS, HAS LACK OF TRANSPORTATION KEPT YOU FROM MEETINGS, WORK, OR FROM GETTING THINGS NEEDED FOR DAILY LIVING?: YES

## 2024-05-14 SDOH — ECONOMIC STABILITY: INCOME INSECURITY: HOW HARD IS IT FOR YOU TO PAY FOR THE VERY BASICS LIKE FOOD, HOUSING, MEDICAL CARE, AND HEATING?: NOT VERY HARD

## 2024-05-14 SDOH — ECONOMIC STABILITY: FOOD INSECURITY: WITHIN THE PAST 12 MONTHS, YOU WORRIED THAT YOUR FOOD WOULD RUN OUT BEFORE YOU GOT MONEY TO BUY MORE.: SOMETIMES TRUE

## 2024-05-17 ENCOUNTER — TELEMEDICINE (OUTPATIENT)
Dept: PRIMARY CARE CLINIC | Age: 64
End: 2024-05-17
Payer: MEDICARE

## 2024-05-17 DIAGNOSIS — E11.42 DIABETIC POLYNEUROPATHY ASSOCIATED WITH TYPE 2 DIABETES MELLITUS (HCC): ICD-10-CM

## 2024-05-17 DIAGNOSIS — F33.1 MODERATE EPISODE OF RECURRENT MAJOR DEPRESSIVE DISORDER (HCC): ICD-10-CM

## 2024-05-17 DIAGNOSIS — I10 ESSENTIAL (PRIMARY) HYPERTENSION: ICD-10-CM

## 2024-05-17 DIAGNOSIS — E78.2 MIXED HYPERLIPIDEMIA: Primary | ICD-10-CM

## 2024-05-17 DIAGNOSIS — E05.90 HYPERTHYROIDISM: ICD-10-CM

## 2024-05-17 DIAGNOSIS — M83.9 VITAMIN D DEFICIENT OSTEOMALACIA: ICD-10-CM

## 2024-05-17 DIAGNOSIS — J30.1 ALLERGIC RHINITIS DUE TO POLLEN, UNSPECIFIED SEASONALITY: ICD-10-CM

## 2024-05-17 PROCEDURE — 2022F DILAT RTA XM EVC RTNOPTHY: CPT | Performed by: INTERNAL MEDICINE

## 2024-05-17 PROCEDURE — G8427 DOCREV CUR MEDS BY ELIG CLIN: HCPCS | Performed by: INTERNAL MEDICINE

## 2024-05-17 PROCEDURE — 3051F HG A1C>EQUAL 7.0%<8.0%: CPT | Performed by: INTERNAL MEDICINE

## 2024-05-17 PROCEDURE — 3017F COLORECTAL CA SCREEN DOC REV: CPT | Performed by: INTERNAL MEDICINE

## 2024-05-17 PROCEDURE — 1036F TOBACCO NON-USER: CPT | Performed by: INTERNAL MEDICINE

## 2024-05-17 PROCEDURE — 99214 OFFICE O/P EST MOD 30 MIN: CPT | Performed by: INTERNAL MEDICINE

## 2024-05-17 PROCEDURE — G8417 CALC BMI ABV UP PARAM F/U: HCPCS | Performed by: INTERNAL MEDICINE

## 2024-05-17 RX ORDER — INSULIN GLARGINE 100 [IU]/ML
35 INJECTION, SOLUTION SUBCUTANEOUS 2 TIMES DAILY
Qty: 7 ADJUSTABLE DOSE PRE-FILLED PEN SYRINGE | Refills: 5 | Status: SHIPPED | OUTPATIENT
Start: 2024-05-17

## 2024-05-17 RX ORDER — VENLAFAXINE 75 MG/1
75 TABLET ORAL DAILY
Qty: 30 TABLET | Refills: 5 | Status: SHIPPED | OUTPATIENT
Start: 2024-05-17

## 2024-05-17 RX ORDER — AMLODIPINE BESYLATE 5 MG/1
5 TABLET ORAL DAILY
Qty: 30 TABLET | Refills: 5 | Status: SHIPPED | OUTPATIENT
Start: 2024-05-17

## 2024-05-17 RX ORDER — INSULIN ASPART 100 [IU]/ML
INJECTION, SOLUTION INTRAVENOUS; SUBCUTANEOUS
Qty: 15 ML | Refills: 10 | Status: SHIPPED | OUTPATIENT
Start: 2024-05-17

## 2024-05-17 RX ORDER — LOSARTAN POTASSIUM 100 MG/1
100 TABLET ORAL DAILY
Qty: 30 TABLET | Refills: 5 | Status: SHIPPED | OUTPATIENT
Start: 2024-05-17

## 2024-05-17 RX ORDER — ERGOCALCIFEROL 1.25 MG/1
50000 CAPSULE ORAL WEEKLY
Qty: 12 CAPSULE | Refills: 1 | Status: SHIPPED | OUTPATIENT
Start: 2024-05-17

## 2024-05-17 RX ORDER — FLUTICASONE PROPIONATE 50 MCG
SPRAY, SUSPENSION (ML) NASAL
Qty: 16 G | Refills: 3 | Status: SHIPPED | OUTPATIENT
Start: 2024-05-17

## 2024-05-17 RX ORDER — FUROSEMIDE 20 MG/1
20 TABLET ORAL DAILY
Qty: 30 TABLET | Refills: 5 | Status: SHIPPED | OUTPATIENT
Start: 2024-05-17

## 2024-05-17 RX ORDER — LEVOTHYROXINE SODIUM 88 UG/1
88 TABLET ORAL DAILY
Qty: 90 TABLET | Refills: 1 | Status: SHIPPED | OUTPATIENT
Start: 2024-05-17 | End: 2024-11-13

## 2024-05-17 RX ORDER — MONTELUKAST SODIUM 10 MG/1
10 TABLET ORAL DAILY
Qty: 30 TABLET | Refills: 5 | Status: SHIPPED | OUTPATIENT
Start: 2024-05-17

## 2024-05-17 RX ORDER — HYDROCHLOROTHIAZIDE 12.5 MG/1
12.5 CAPSULE, GELATIN COATED ORAL EVERY MORNING
Qty: 90 CAPSULE | Refills: 1 | Status: SHIPPED | OUTPATIENT
Start: 2024-05-17

## 2024-05-17 SDOH — ECONOMIC STABILITY: FOOD INSECURITY: WITHIN THE PAST 12 MONTHS, THE FOOD YOU BOUGHT JUST DIDN'T LAST AND YOU DIDN'T HAVE MONEY TO GET MORE.: NEVER TRUE

## 2024-05-17 SDOH — ECONOMIC STABILITY: FOOD INSECURITY: WITHIN THE PAST 12 MONTHS, YOU WORRIED THAT YOUR FOOD WOULD RUN OUT BEFORE YOU GOT MONEY TO BUY MORE.: NEVER TRUE

## 2024-05-17 SDOH — ECONOMIC STABILITY: INCOME INSECURITY: HOW HARD IS IT FOR YOU TO PAY FOR THE VERY BASICS LIKE FOOD, HOUSING, MEDICAL CARE, AND HEATING?: NOT HARD AT ALL

## 2024-05-17 NOTE — PATIENT INSTRUCTIONS
[place] Transportation Resources*  (Call 211 if need more resources.)       [employer] (CARTS):  What they offer: Public transportation for all of [facility]. Call at least 24 hours in advance to make reservation. Reduced rates for age 65 and over.  Phone Number: [phone] ext [phone]    [facility] TRANSPORTATION:  What they offer: No cost transportation for [place] residents aged 65 and over; weekly shopping schedule.  Phone: [phone]    [facility] TRANSPORTATION:  What they offer: Transportation for [facility] residents aged 60 and over who do not require assistance getting on or off vehicle. Service for medical appointments, congregate meal sites, grocery shopping or social service appointments for most of [facility]. Cost: $5 round trip donation.  Phone: [phone]    [facility] TRANSPORTATION:  What they offer: Transportation for [facility] residents aged 60 and over or handicapped on a weekly schedule for medical appointments, shopping, banking, etc. in [place] and [place]. Cost: donation.  Phone: [phone] ext [phone]    [facility] DEPARTMENT OF JOB AND FAMILY SERVICES:  What they offer: No cost medical transportation for persons on Medicaid.  Phone number: [phone] ext [phone]    [facility] TRANSPORTATION:  What they offer: Transportation for city residents aged 60 and over for [facility] medical appointments and shopping on a weekly schedule. 24-hour notice required. Cost: donation.  Phone: [phone] or [phone]  Website: [employer]    [facility] RURAL SERVICES:  What they offer: No cost transportation to the handicapped and seniors for medical appointments and grocery shopping or in [email or web address], [employer].  Phone: [phone] or [phone]    [facility] TRANSPORTATION:  What they offer: No cost transportation for local seniors to medical appointments, shopping and social activities within

## 2024-05-20 RX ORDER — PEN NEEDLE, DIABETIC 31 GX5/16"
NEEDLE, DISPOSABLE MISCELLANEOUS
Qty: 200 EACH | Refills: 10 | Status: SHIPPED | OUTPATIENT
Start: 2024-05-20

## 2024-05-20 RX ORDER — ISOPROPYL ALCOHOL 70 ML/100ML
SWAB TOPICAL
Qty: 100 EACH | Refills: 10 | Status: SHIPPED | OUTPATIENT
Start: 2024-05-20

## 2024-05-28 DIAGNOSIS — E11.42 DIABETIC POLYNEUROPATHY ASSOCIATED WITH TYPE 2 DIABETES MELLITUS (HCC): ICD-10-CM

## 2024-05-29 RX ORDER — INSULIN GLARGINE 100 [IU]/ML
INJECTION, SOLUTION SUBCUTANEOUS
Qty: 15 ML | Refills: 10 | OUTPATIENT
Start: 2024-05-29

## 2024-06-26 LAB — DIABETIC RETINOPATHY: POSITIVE

## 2024-08-02 DIAGNOSIS — E11.42 DIABETIC POLYNEUROPATHY ASSOCIATED WITH TYPE 2 DIABETES MELLITUS (HCC): ICD-10-CM

## 2024-08-05 RX ORDER — INSULIN ASPART 100 [IU]/ML
INJECTION, SOLUTION INTRAVENOUS; SUBCUTANEOUS
Qty: 15 ML | Refills: 10 | Status: SHIPPED | OUTPATIENT
Start: 2024-08-05

## 2024-10-17 DIAGNOSIS — I10 ESSENTIAL (PRIMARY) HYPERTENSION: ICD-10-CM

## 2024-10-18 RX ORDER — HYDROCHLOROTHIAZIDE 12.5 MG/1
12.5 CAPSULE ORAL EVERY MORNING
Qty: 30 CAPSULE | Refills: 0 | Status: SHIPPED | OUTPATIENT
Start: 2024-10-18

## 2024-10-18 NOTE — TELEPHONE ENCOUNTER
Name of Medication(s) Requested:  Requested Prescriptions     Pending Prescriptions Disp Refills    hydroCHLOROthiazide 12.5 MG capsule [Pharmacy Med Name: HYDROCHLOROTHIAZ 12.5MG CAP 12.5 Capsule] 90 capsule 1     Sig: TAKE 1 CAPSULE BY MOUTH EVERY MORNING       Medication is on current medication list Yes    Dosage and directions were verified? No    Quantity verified: 90 day supply     Pharmacy Verified?  Yes    Last Appointment:  5/17/2024    Future appts:  No future appointments.     (If no appt send self scheduling link. .[email or web address])  Scheduling request sent?     [x] Yes  [] No    Does patient need updated?  [] Yes  [x] No

## 2024-11-14 ENCOUNTER — OFFICE VISIT (OUTPATIENT)
Dept: PRIMARY CARE CLINIC | Age: 64
End: 2024-11-14

## 2024-11-14 VITALS
DIASTOLIC BLOOD PRESSURE: 70 MMHG | HEIGHT: 64 IN | HEART RATE: 77 BPM | OXYGEN SATURATION: 97 % | TEMPERATURE: 97.2 F | WEIGHT: 293 LBS | SYSTOLIC BLOOD PRESSURE: 130 MMHG | BODY MASS INDEX: 50.02 KG/M2

## 2024-11-14 DIAGNOSIS — E11.65 TYPE 2 DIABETES MELLITUS WITH HYPERGLYCEMIA, WITH LONG-TERM CURRENT USE OF INSULIN (HCC): ICD-10-CM

## 2024-11-14 DIAGNOSIS — E11.42 DIABETIC POLYNEUROPATHY ASSOCIATED WITH TYPE 2 DIABETES MELLITUS (HCC): ICD-10-CM

## 2024-11-14 DIAGNOSIS — E66.813 CLASS 3 SEVERE OBESITY DUE TO EXCESS CALORIES WITH SERIOUS COMORBIDITY AND BODY MASS INDEX (BMI) GREATER THAN OR EQUAL TO 70 IN ADULT: ICD-10-CM

## 2024-11-14 DIAGNOSIS — Z23 NEEDS FLU SHOT: ICD-10-CM

## 2024-11-14 DIAGNOSIS — F33.1 MODERATE EPISODE OF RECURRENT MAJOR DEPRESSIVE DISORDER (HCC): ICD-10-CM

## 2024-11-14 DIAGNOSIS — J30.1 ALLERGIC RHINITIS DUE TO POLLEN, UNSPECIFIED SEASONALITY: ICD-10-CM

## 2024-11-14 DIAGNOSIS — M83.9 VITAMIN D DEFICIENT OSTEOMALACIA: ICD-10-CM

## 2024-11-14 DIAGNOSIS — E78.2 MIXED HYPERLIPIDEMIA: ICD-10-CM

## 2024-11-14 DIAGNOSIS — E05.90 HYPERTHYROIDISM: ICD-10-CM

## 2024-11-14 DIAGNOSIS — I10 ESSENTIAL (PRIMARY) HYPERTENSION: Primary | ICD-10-CM

## 2024-11-14 DIAGNOSIS — I10 ESSENTIAL (PRIMARY) HYPERTENSION: ICD-10-CM

## 2024-11-14 DIAGNOSIS — Z79.4 TYPE 2 DIABETES MELLITUS WITH HYPERGLYCEMIA, WITH LONG-TERM CURRENT USE OF INSULIN (HCC): ICD-10-CM

## 2024-11-14 DIAGNOSIS — E66.01 CLASS 3 SEVERE OBESITY DUE TO EXCESS CALORIES WITH SERIOUS COMORBIDITY AND BODY MASS INDEX (BMI) GREATER THAN OR EQUAL TO 70 IN ADULT: ICD-10-CM

## 2024-11-14 LAB
ALBUMIN: 3.8 G/DL (ref 3.5–5.2)
ALP BLD-CCNC: 103 U/L (ref 35–104)
ALT SERPL-CCNC: 11 U/L (ref 0–32)
ANION GAP SERPL CALCULATED.3IONS-SCNC: 16 MMOL/L (ref 7–16)
AST SERPL-CCNC: 17 U/L (ref 0–31)
BILIRUB SERPL-MCNC: 0.4 MG/DL (ref 0–1.2)
BUN BLDV-MCNC: 27 MG/DL (ref 6–23)
CALCIUM SERPL-MCNC: 9.7 MG/DL (ref 8.6–10.2)
CHLORIDE BLD-SCNC: 97 MMOL/L (ref 98–107)
CHOLESTEROL, FASTING: 144 MG/DL
CO2: 27 MMOL/L (ref 22–29)
CREAT SERPL-MCNC: 1 MG/DL (ref 0.5–1)
CREATININE URINE: 57.2 MG/DL (ref 29–226)
GFR, ESTIMATED: 64 ML/MIN/1.73M2
GLUCOSE BLD-MCNC: 296 MG/DL (ref 74–99)
HBA1C MFR BLD: 8.3 %
HCT VFR BLD CALC: 39.9 % (ref 34–48)
HDLC SERPL-MCNC: 51 MG/DL
HEMOGLOBIN: 12 G/DL (ref 11.5–15.5)
LDL CHOLESTEROL: 68 MG/DL
MCH RBC QN AUTO: 26.9 PG (ref 26–35)
MCHC RBC AUTO-ENTMCNC: 30.1 G/DL (ref 32–34.5)
MCV RBC AUTO: 89.5 FL (ref 80–99.9)
MICROALBUMIN/CREAT 24H UR: 13 MG/L (ref 0–19)
MICROALBUMIN/CREAT UR-RTO: 22 MCG/MG CREAT (ref 0–30)
PDW BLD-RTO: 15.8 % (ref 11.5–15)
PLATELET # BLD: 209 K/UL (ref 130–450)
PMV BLD AUTO: 12.7 FL (ref 7–12)
POTASSIUM SERPL-SCNC: 5.2 MMOL/L (ref 3.5–5)
RBC # BLD: 4.46 M/UL (ref 3.5–5.5)
SODIUM BLD-SCNC: 140 MMOL/L (ref 132–146)
T4 FREE: 1.7 NG/DL (ref 0.9–1.7)
TOTAL PROTEIN: 7 G/DL (ref 6.4–8.3)
TRIGLYCERIDE, FASTING: 123 MG/DL
TSH SERPL DL<=0.05 MIU/L-ACNC: 2.93 UIU/ML (ref 0.27–4.2)
VLDLC SERPL CALC-MCNC: 25 MG/DL
WBC # BLD: 8.6 K/UL (ref 4.5–11.5)

## 2024-11-14 RX ORDER — ERGOCALCIFEROL 1.25 MG/1
50000 CAPSULE, LIQUID FILLED ORAL WEEKLY
Qty: 12 CAPSULE | Refills: 1 | Status: SHIPPED | OUTPATIENT
Start: 2024-11-14

## 2024-11-14 RX ORDER — METFORMIN HYDROCHLORIDE 500 MG/1
1000 TABLET, EXTENDED RELEASE ORAL
Qty: 60 TABLET | Refills: 5 | Status: SHIPPED | OUTPATIENT
Start: 2024-11-14

## 2024-11-14 RX ORDER — INSULIN ASPART 100 [IU]/ML
INJECTION, SOLUTION INTRAVENOUS; SUBCUTANEOUS
Qty: 15 ML | Refills: 10 | Status: SHIPPED | OUTPATIENT
Start: 2024-11-14

## 2024-11-14 RX ORDER — LEVOTHYROXINE SODIUM 88 UG/1
88 TABLET ORAL DAILY
Qty: 90 TABLET | Refills: 1 | Status: SHIPPED | OUTPATIENT
Start: 2024-11-14 | End: 2025-05-13

## 2024-11-14 RX ORDER — MONTELUKAST SODIUM 10 MG/1
10 TABLET ORAL DAILY
Qty: 30 TABLET | Refills: 5 | Status: SHIPPED | OUTPATIENT
Start: 2024-11-14

## 2024-11-14 RX ORDER — ISOPROPYL ALCOHOL 70 ML/100ML
SWAB TOPICAL
Qty: 100 EACH | Refills: 10 | Status: SHIPPED | OUTPATIENT
Start: 2024-11-14

## 2024-11-14 RX ORDER — FLUTICASONE PROPIONATE 50 MCG
SPRAY, SUSPENSION (ML) NASAL
Qty: 16 G | Refills: 3 | Status: SHIPPED | OUTPATIENT
Start: 2024-11-14

## 2024-11-14 RX ORDER — FUROSEMIDE 20 MG/1
20 TABLET ORAL DAILY
Qty: 30 TABLET | Refills: 5 | Status: SHIPPED | OUTPATIENT
Start: 2024-11-14

## 2024-11-14 RX ORDER — VENLAFAXINE 75 MG/1
75 TABLET ORAL DAILY
Qty: 30 TABLET | Refills: 5 | Status: SHIPPED | OUTPATIENT
Start: 2024-11-14

## 2024-11-14 RX ORDER — AMLODIPINE BESYLATE 5 MG/1
5 TABLET ORAL DAILY
Qty: 30 TABLET | Refills: 5 | Status: SHIPPED | OUTPATIENT
Start: 2024-11-14

## 2024-11-14 RX ORDER — LOSARTAN POTASSIUM 100 MG/1
100 TABLET ORAL DAILY
Qty: 30 TABLET | Refills: 5 | Status: SHIPPED | OUTPATIENT
Start: 2024-11-14

## 2024-11-14 ASSESSMENT — ENCOUNTER SYMPTOMS
EYE REDNESS: 0
SINUS PAIN: 0
EYE ITCHING: 0
RHINORRHEA: 0
RESPIRATORY NEGATIVE: 1
FACIAL SWELLING: 0
EYE PAIN: 0
ALLERGIC/IMMUNOLOGIC NEGATIVE: 1
EYE DISCHARGE: 0
GASTROINTESTINAL NEGATIVE: 1
EYES NEGATIVE: 1

## 2024-11-14 NOTE — PROGRESS NOTES
Nimisha Gordon (:  1960) is a 64 y.o. female,Established patient, here for evaluation of the following chief complaint(s):  6 Month Follow-Up (Labs ordered 2024 were not completed.)    This was a virtual visit  Subjective   SUBJECTIVE/OBJECTIVE:  HPI  Hypertension:  Patient is here for follow up chronic hypertension.  This is  generally controlled on current medication regimen.    BP Readings from Last 1 Encounters:   24 130/70        Takes meds as directed and tolerates them well.  Most recent labs reviewed with patient and are not remarkable.  No symptoms from htn standpoint per ROS.  Patient is  compliant with lifestyle modifications.  Patient does not smoke.  Comorbid conditions include obesity diabetes   DM2:   Patient is here to fu regarding DM2. Patient is  controlled.  Taking all medications and tolerating well.  Currently on.  Fasting sugars are running insulin.  Patient is taking ASA .  No hypoglycemic episodes.  Saw an Eye Dr . Patient is aware that it is necessary to see an Eye Dr yearly.  Patient does not smoke.  Most recent labs reviewed with patient.  Patient does not have complaints or concerns today. Patient checks blood sugar 3 per day  Hyperlipidemia:  Patient is here to follow up regarding chronic hyperlipidemia.  This is  generally controlled.   not on medications patient is  compliant with lifestyle modifications.  Patient is not a smoker.  Most recent labs reviewed with patient today and are not remarkable.  Comorbid conditions include obesity.  And hypertension  Hypothyroidism:  Patient is here today to follow up chronic hypothyroidism.  This is   generally controlled on current medication regimen.  Takes medication as directed and tolerates well.  Symptoms from thyroid standpoint include none.  Most recent labs reviewed with patient and are not remarkable.  Thyroid function in particular is  controlled.    Morbid obesity failed surgery patient is morbidly obese back she had

## 2024-11-15 DIAGNOSIS — I10 ESSENTIAL (PRIMARY) HYPERTENSION: ICD-10-CM

## 2024-11-18 RX ORDER — HYDROCHLOROTHIAZIDE 12.5 MG/1
12.5 CAPSULE ORAL EVERY MORNING
Qty: 90 CAPSULE | Refills: 1 | Status: SHIPPED | OUTPATIENT
Start: 2024-11-18

## 2024-11-18 NOTE — TELEPHONE ENCOUNTER
Name of Medication(s) Requested:  Requested Prescriptions     Pending Prescriptions Disp Refills    hydroCHLOROthiazide 12.5 MG capsule [Pharmacy Med Name: HYDROCHLOROTHIAZ 12.5MG CAP 12.5 Capsule] 30 capsule 10     Sig: TAKE 1 CAPSULE BY MOUTH EVERY MORNING       Medication is on current medication list Yes    Dosage and directions were verified? Yes    Quantity verified: 90 day supply     Pharmacy Verified?  Yes    Last Appointment:  11/14/2024    Future appts:  No future appointments.     (If no appt send self scheduling link. .REFILLAPPT)  Scheduling request sent?     [] Yes  [] No    Does patient need updated?  [] Yes  [] No

## 2025-01-21 ENCOUNTER — TELEPHONE (OUTPATIENT)
Dept: PRIMARY CARE CLINIC | Age: 65
End: 2025-01-21

## 2025-01-21 DIAGNOSIS — E11.65 TYPE 2 DIABETES MELLITUS WITH HYPERGLYCEMIA, WITH LONG-TERM CURRENT USE OF INSULIN (HCC): Primary | ICD-10-CM

## 2025-01-21 DIAGNOSIS — E66.813 CLASS 3 SEVERE OBESITY DUE TO EXCESS CALORIES WITH SERIOUS COMORBIDITY AND BODY MASS INDEX (BMI) GREATER THAN OR EQUAL TO 70 IN ADULT: ICD-10-CM

## 2025-01-21 DIAGNOSIS — E66.01 CLASS 3 SEVERE OBESITY DUE TO EXCESS CALORIES WITH SERIOUS COMORBIDITY AND BODY MASS INDEX (BMI) GREATER THAN OR EQUAL TO 70 IN ADULT: ICD-10-CM

## 2025-01-21 DIAGNOSIS — Z79.4 TYPE 2 DIABETES MELLITUS WITH HYPERGLYCEMIA, WITH LONG-TERM CURRENT USE OF INSULIN (HCC): Primary | ICD-10-CM

## 2025-01-21 RX ORDER — GLUCOSAMINE HCL/CHONDROITIN SU 500-400 MG
CAPSULE ORAL
Qty: 300 STRIP | Refills: 0 | Status: SHIPPED | OUTPATIENT
Start: 2025-01-21

## 2025-01-21 NOTE — TELEPHONE ENCOUNTER
Received call from [email or web address] stating she has been borrowing a wheelchair and would like order for one of her own.

## 2025-01-23 ENCOUNTER — TELEPHONE (OUTPATIENT)
Dept: PRIMARY CARE CLINIC | Age: 65
End: 2025-01-23

## 2025-01-23 DIAGNOSIS — E11.42 DIABETIC POLYNEUROPATHY ASSOCIATED WITH TYPE 2 DIABETES MELLITUS (HCC): Primary | ICD-10-CM

## 2025-01-23 NOTE — TELEPHONE ENCOUNTER
Received fax from [facility] stating that patient was now requesting a 4 prong cane and a bariatric bedside commode.  Will need faxed to [phone]    Pended for dr consuelo mckinnon.

## 2025-02-10 DIAGNOSIS — E11.42 DIABETIC POLYNEUROPATHY ASSOCIATED WITH TYPE 2 DIABETES MELLITUS (HCC): ICD-10-CM

## 2025-02-10 RX ORDER — INSULIN GLARGINE 100 [IU]/ML
INJECTION, SOLUTION SUBCUTANEOUS
Qty: 36 ML | Refills: 0 | Status: SHIPPED | OUTPATIENT
Start: 2025-02-10

## 2025-03-18 ENCOUNTER — TELEPHONE (OUTPATIENT)
Dept: PRIMARY CARE CLINIC | Age: 65
End: 2025-03-18

## 2025-03-18 DIAGNOSIS — E66.813 CLASS 3 SEVERE OBESITY DUE TO EXCESS CALORIES WITH SERIOUS COMORBIDITY AND BODY MASS INDEX (BMI) GREATER THAN OR EQUAL TO 70 IN ADULT: ICD-10-CM

## 2025-03-18 DIAGNOSIS — E66.01 CLASS 3 SEVERE OBESITY DUE TO EXCESS CALORIES WITH SERIOUS COMORBIDITY AND BODY MASS INDEX (BMI) GREATER THAN OR EQUAL TO 70 IN ADULT: ICD-10-CM

## 2025-03-24 DIAGNOSIS — E11.42 DIABETIC POLYNEUROPATHY ASSOCIATED WITH TYPE 2 DIABETES MELLITUS: ICD-10-CM

## 2025-03-25 RX ORDER — INSULIN GLARGINE 100 [IU]/ML
INJECTION, SOLUTION SUBCUTANEOUS
Qty: 36 ML | Refills: 0 | Status: SHIPPED | OUTPATIENT
Start: 2025-03-25

## 2025-03-26 ENCOUNTER — TELEPHONE (OUTPATIENT)
Dept: PRIMARY CARE CLINIC | Age: 65
End: 2025-03-26

## 2025-03-26 NOTE — TELEPHONE ENCOUNTER
Pt's daughter came into office stating pts mentality is declining. Daughter giuliana and her sons are living with her and she screams and cries over small inconvenience. Stated she is rude and throws items at them. Pt's daughter given the option to call ambulance and have pt taken to hosp for psych eval and possible assisted living placement but pt also has appt with dr worrell on 04/07. Giuliana not on HIPAA but advised that we would call Abigail back if need be.

## 2025-03-29 DIAGNOSIS — J30.1 ALLERGIC RHINITIS DUE TO POLLEN, UNSPECIFIED SEASONALITY: ICD-10-CM

## 2025-03-29 DIAGNOSIS — E11.42 DIABETIC POLYNEUROPATHY ASSOCIATED WITH TYPE 2 DIABETES MELLITUS: ICD-10-CM

## 2025-03-29 DIAGNOSIS — I10 ESSENTIAL (PRIMARY) HYPERTENSION: ICD-10-CM

## 2025-03-29 DIAGNOSIS — F33.1 MODERATE EPISODE OF RECURRENT MAJOR DEPRESSIVE DISORDER (HCC): ICD-10-CM

## 2025-03-30 DIAGNOSIS — I10 ESSENTIAL (PRIMARY) HYPERTENSION: ICD-10-CM

## 2025-03-31 RX ORDER — FUROSEMIDE 20 MG/1
20 TABLET ORAL DAILY
Qty: 30 TABLET | Refills: 4 | Status: SHIPPED | OUTPATIENT
Start: 2025-03-31

## 2025-03-31 RX ORDER — VENLAFAXINE 75 MG/1
75 TABLET ORAL DAILY
Qty: 30 TABLET | Refills: 4 | Status: SHIPPED | OUTPATIENT
Start: 2025-03-31

## 2025-03-31 RX ORDER — AMLODIPINE BESYLATE 5 MG/1
5 TABLET ORAL DAILY
Qty: 30 TABLET | Refills: 4 | Status: SHIPPED | OUTPATIENT
Start: 2025-03-31

## 2025-03-31 RX ORDER — MONTELUKAST SODIUM 10 MG/1
10 TABLET ORAL DAILY
Qty: 30 TABLET | Refills: 4 | Status: SHIPPED | OUTPATIENT
Start: 2025-03-31

## 2025-03-31 RX ORDER — METFORMIN HYDROCHLORIDE 500 MG/1
TABLET, EXTENDED RELEASE ORAL
Qty: 60 TABLET | Refills: 4 | Status: SHIPPED | OUTPATIENT
Start: 2025-03-31

## 2025-03-31 RX ORDER — LOSARTAN POTASSIUM 100 MG/1
100 TABLET ORAL DAILY
Qty: 30 TABLET | Refills: 4 | Status: SHIPPED | OUTPATIENT
Start: 2025-03-31

## 2025-03-31 NOTE — TELEPHONE ENCOUNTER
Name of Medication(s) Requested:  Requested Prescriptions     Pending Prescriptions Disp Refills    losartan (COZAAR) 100 MG tablet [Pharmacy Med Name: losartan 100 mg tablet] 30 tablet 4     Sig: TAKE ONE TABLET BY MOUTH EVERY DAY       Medication is on current medication list Yes    Dosage and directions were verified? Yes    Quantity verified: 90 day supply     Pharmacy Verified?  Yes    Last Appointment:  11/14/2024    Future appts:  Future Appointments   Date Time Provider Department Center   4/7/2025 12:15 PM Maverick Davies MD [place] [facility] DE[facility]        (If no appt send self scheduling link. .REFILLAPPT)  Scheduling request sent?     [] Yes  [x] No    Does patient need updated?  [] Yes  [x] No

## 2025-03-31 NOTE — TELEPHONE ENCOUNTER
Name of Medication(s) Requested:  Requested Prescriptions     Pending Prescriptions Disp Refills    montelukast (SINGULAIR) 10 MG tablet [Pharmacy Med Name: montelukast 10 mg tablet] 30 tablet 4     Sig: TAKE ONE TABLET BY MOUTH EVERY DAY    amLODIPine (NORVASC) 5 MG tablet [Pharmacy Med Name: amlodipine 5 mg tablet] 30 tablet 4     Sig: TAKE ONE TABLET BY MOUTH EVERY DAY    furosemide (LASIX) 20 MG tablet [Pharmacy Med Name: furosemide 20 mg tablet] 30 tablet 4     Sig: TAKE ONE TABLET BY MOUTH EVERY DAY    venlafaxine (EFFEXOR) 75 MG tablet [Pharmacy Med Name: venlafaxine 75 mg tablet] 30 tablet 4     Sig: TAKE ONE TABLET BY MOUTH EVERY DAY    metFORMIN (GLUCOPHAGE-XR) 500 MG extended release tablet [Pharmacy Med Name: metformin  mg tablet,extended release 24 hr] 60 tablet 4     Sig: TAKE TWO TABLETS BY MOUTH EVERY DAY WITH BREAKFAST       Medication is on current medication list Yes    Dosage and directions were verified? Yes    Quantity verified: 90 day supply     Pharmacy Verified?  Yes    Last Appointment:  11/14/2024    Future appts:  Future Appointments   Date Time Provider Department Center   4/7/2025 12:15 PM Maverick Davies MD [place] [facility] DEP        (If no appt send self scheduling link. .REFILLAPPT)  Scheduling request sent?     [] Yes  [x] No    Does patient need updated?  [] Yes  [x] No

## 2025-04-03 ASSESSMENT — PATIENT HEALTH QUESTIONNAIRE - PHQ9
9. THOUGHTS THAT YOU WOULD BE BETTER OFF DEAD, OR OF HURTING YOURSELF: NOT AT ALL
10. IF YOU CHECKED OFF ANY PROBLEMS, HOW DIFFICULT HAVE THESE PROBLEMS MADE IT FOR YOU TO DO YOUR WORK, TAKE CARE OF THINGS AT HOME, OR GET ALONG WITH OTHER PEOPLE: SOMEWHAT DIFFICULT
9. THOUGHTS THAT YOU WOULD BE BETTER OFF DEAD, OR OF HURTING YOURSELF: NOT AT ALL
1. LITTLE INTEREST OR PLEASURE IN DOING THINGS: SEVERAL DAYS
4. FEELING TIRED OR HAVING LITTLE ENERGY: MORE THAN HALF THE DAYS
5. POOR APPETITE OR OVEREATING: SEVERAL DAYS
SUM OF ALL RESPONSES TO PHQ QUESTIONS 1-9: 8
SUM OF ALL RESPONSES TO PHQ QUESTIONS 1-9: 8
2. FEELING DOWN, DEPRESSED OR HOPELESS: SEVERAL DAYS
8. MOVING OR SPEAKING SO SLOWLY THAT OTHER PEOPLE COULD HAVE NOTICED. OR THE OPPOSITE - BEING SO FIDGETY OR RESTLESS THAT YOU HAVE BEEN MOVING AROUND A LOT MORE THAN USUAL: NOT AT ALL
3. TROUBLE FALLING OR STAYING ASLEEP: MORE THAN HALF THE DAYS
SUM OF ALL RESPONSES TO PHQ QUESTIONS 1-9: 8
6. FEELING BAD ABOUT YOURSELF - OR THAT YOU ARE A FAILURE OR HAVE LET YOURSELF OR YOUR FAMILY DOWN: NOT AT ALL
7. TROUBLE CONCENTRATING ON THINGS, SUCH AS READING THE NEWSPAPER OR WATCHING TELEVISION: SEVERAL DAYS
2. FEELING DOWN, DEPRESSED OR HOPELESS: SEVERAL DAYS
5. POOR APPETITE OR OVEREATING: SEVERAL DAYS
10. IF YOU CHECKED OFF ANY PROBLEMS, HOW DIFFICULT HAVE THESE PROBLEMS MADE IT FOR YOU TO DO YOUR WORK, TAKE CARE OF THINGS AT HOME, OR GET ALONG WITH OTHER PEOPLE: SOMEWHAT DIFFICULT
3. TROUBLE FALLING OR STAYING ASLEEP: MORE THAN HALF THE DAYS
SUM OF ALL RESPONSES TO PHQ QUESTIONS 1-9: 8
1. LITTLE INTEREST OR PLEASURE IN DOING THINGS: SEVERAL DAYS
8. MOVING OR SPEAKING SO SLOWLY THAT OTHER PEOPLE COULD HAVE NOTICED. OR THE OPPOSITE, BEING SO FIGETY OR RESTLESS THAT YOU HAVE BEEN MOVING AROUND A LOT MORE THAN USUAL: NOT AT ALL
4. FEELING TIRED OR HAVING LITTLE ENERGY: MORE THAN HALF THE DAYS
SUM OF ALL RESPONSES TO PHQ QUESTIONS 1-9: 8
6. FEELING BAD ABOUT YOURSELF - OR THAT YOU ARE A FAILURE OR HAVE LET YOURSELF OR YOUR FAMILY DOWN: NOT AT ALL
7. TROUBLE CONCENTRATING ON THINGS, SUCH AS READING THE NEWSPAPER OR WATCHING TELEVISION: SEVERAL DAYS

## 2025-04-04 SDOH — ECONOMIC STABILITY: INCOME INSECURITY: IN THE LAST 12 MONTHS, WAS THERE A TIME WHEN YOU WERE NOT ABLE TO PAY THE MORTGAGE OR RENT ON TIME?: NO

## 2025-04-04 SDOH — ECONOMIC STABILITY: FOOD INSECURITY: WITHIN THE PAST 12 MONTHS, THE FOOD YOU BOUGHT JUST DIDN'T LAST AND YOU DIDN'T HAVE MONEY TO GET MORE.: NEVER TRUE

## 2025-04-04 SDOH — ECONOMIC STABILITY: TRANSPORTATION INSECURITY
IN THE PAST 12 MONTHS, HAS THE LACK OF TRANSPORTATION KEPT YOU FROM MEDICAL APPOINTMENTS OR FROM GETTING MEDICATIONS?: YES

## 2025-04-04 SDOH — ECONOMIC STABILITY: TRANSPORTATION INSECURITY
IN THE PAST 12 MONTHS, HAS LACK OF TRANSPORTATION KEPT YOU FROM MEETINGS, WORK, OR FROM GETTING THINGS NEEDED FOR DAILY LIVING?: NO

## 2025-04-04 SDOH — ECONOMIC STABILITY: FOOD INSECURITY: WITHIN THE PAST 12 MONTHS, YOU WORRIED THAT YOUR FOOD WOULD RUN OUT BEFORE YOU GOT MONEY TO BUY MORE.: SOMETIMES TRUE

## 2025-04-07 ENCOUNTER — OFFICE VISIT (OUTPATIENT)
Dept: PRIMARY CARE CLINIC | Age: 65
End: 2025-04-07
Payer: MEDICARE

## 2025-04-07 VITALS
BODY MASS INDEX: 50.02 KG/M2 | TEMPERATURE: 97.7 F | HEIGHT: 64 IN | SYSTOLIC BLOOD PRESSURE: 120 MMHG | DIASTOLIC BLOOD PRESSURE: 72 MMHG | HEART RATE: 61 BPM | OXYGEN SATURATION: 96 % | WEIGHT: 293 LBS

## 2025-04-07 DIAGNOSIS — E78.2 MIXED HYPERLIPIDEMIA: ICD-10-CM

## 2025-04-07 DIAGNOSIS — I10 ESSENTIAL (PRIMARY) HYPERTENSION: ICD-10-CM

## 2025-04-07 DIAGNOSIS — Z79.4 TYPE 2 DIABETES MELLITUS WITH HYPERGLYCEMIA, WITH LONG-TERM CURRENT USE OF INSULIN (HCC): ICD-10-CM

## 2025-04-07 DIAGNOSIS — Z79.4 TYPE 2 DIABETES MELLITUS WITH HYPERGLYCEMIA, WITH LONG-TERM CURRENT USE OF INSULIN (HCC): Primary | ICD-10-CM

## 2025-04-07 DIAGNOSIS — E05.90 HYPERTHYROIDISM: ICD-10-CM

## 2025-04-07 DIAGNOSIS — E11.65 TYPE 2 DIABETES MELLITUS WITH HYPERGLYCEMIA, WITH LONG-TERM CURRENT USE OF INSULIN (HCC): Primary | ICD-10-CM

## 2025-04-07 DIAGNOSIS — E11.42 DIABETIC POLYNEUROPATHY ASSOCIATED WITH TYPE 2 DIABETES MELLITUS: ICD-10-CM

## 2025-04-07 DIAGNOSIS — M83.9 VITAMIN D DEFICIENT OSTEOMALACIA: ICD-10-CM

## 2025-04-07 DIAGNOSIS — E11.65 TYPE 2 DIABETES MELLITUS WITH HYPERGLYCEMIA, WITH LONG-TERM CURRENT USE OF INSULIN (HCC): ICD-10-CM

## 2025-04-07 DIAGNOSIS — K63.2 ABDOMINAL FISTULA: ICD-10-CM

## 2025-04-07 DIAGNOSIS — J30.1 ALLERGIC RHINITIS DUE TO POLLEN, UNSPECIFIED SEASONALITY: ICD-10-CM

## 2025-04-07 LAB
ALBUMIN: 3.7 G/DL (ref 3.5–5.2)
ALP BLD-CCNC: 93 U/L (ref 35–104)
ALT SERPL-CCNC: 20 U/L (ref 0–32)
ANION GAP SERPL CALCULATED.3IONS-SCNC: 15 MMOL/L (ref 7–16)
AST SERPL-CCNC: 17 U/L (ref 0–31)
BILIRUB SERPL-MCNC: 0.6 MG/DL (ref 0–1.2)
BUN BLDV-MCNC: 25 MG/DL (ref 6–23)
CALCIUM SERPL-MCNC: 9.1 MG/DL (ref 8.6–10.2)
CHLORIDE BLD-SCNC: 99 MMOL/L (ref 98–107)
CHOLESTEROL, FASTING: 123 MG/DL
CO2: 25 MMOL/L (ref 22–29)
CREAT SERPL-MCNC: 0.9 MG/DL (ref 0.5–1)
GFR, ESTIMATED: 69 ML/MIN/1.73M2
GLUCOSE BLD-MCNC: 211 MG/DL (ref 74–99)
HBA1C MFR BLD: 7.3 % (ref 4–5.6)
HBA1C MFR BLD: 7.8 %
HCT VFR BLD CALC: 36.8 % (ref 34–48)
HDLC SERPL-MCNC: 46 MG/DL
HEMOGLOBIN: 11.5 G/DL (ref 11.5–15.5)
LDL CHOLESTEROL: 53 MG/DL
MCH RBC QN AUTO: 27.8 PG (ref 26–35)
MCHC RBC AUTO-ENTMCNC: 31.3 G/DL (ref 32–34.5)
MCV RBC AUTO: 88.9 FL (ref 80–99.9)
PDW BLD-RTO: 16.4 % (ref 11.5–15)
PLATELET # BLD: 208 K/UL (ref 130–450)
PMV BLD AUTO: 12.1 FL (ref 7–12)
POTASSIUM SERPL-SCNC: 4.1 MMOL/L (ref 3.5–5)
RBC # BLD: 4.14 M/UL (ref 3.5–5.5)
SODIUM BLD-SCNC: 139 MMOL/L (ref 132–146)
T4 FREE: 1.6 NG/DL (ref 0.9–1.7)
TOTAL PROTEIN: 6.5 G/DL (ref 6.4–8.3)
TRIGLYCERIDE, FASTING: 118 MG/DL
TSH SERPL DL<=0.05 MIU/L-ACNC: 2.95 UIU/ML (ref 0.27–4.2)
VLDLC SERPL CALC-MCNC: 24 MG/DL
WBC # BLD: 9.3 K/UL (ref 4.5–11.5)

## 2025-04-07 PROCEDURE — G8417 CALC BMI ABV UP PARAM F/U: HCPCS | Performed by: INTERNAL MEDICINE

## 2025-04-07 PROCEDURE — G0557 PR ADV PRIM CARE MGMT LVL 2: HCPCS | Performed by: INTERNAL MEDICINE

## 2025-04-07 PROCEDURE — G8427 DOCREV CUR MEDS BY ELIG CLIN: HCPCS | Performed by: INTERNAL MEDICINE

## 2025-04-07 PROCEDURE — 99214 OFFICE O/P EST MOD 30 MIN: CPT | Performed by: INTERNAL MEDICINE

## 2025-04-07 PROCEDURE — 3017F COLORECTAL CA SCREEN DOC REV: CPT | Performed by: INTERNAL MEDICINE

## 2025-04-07 PROCEDURE — 3074F SYST BP LT 130 MM HG: CPT | Performed by: INTERNAL MEDICINE

## 2025-04-07 PROCEDURE — 3078F DIAST BP <80 MM HG: CPT | Performed by: INTERNAL MEDICINE

## 2025-04-07 PROCEDURE — 3051F HG A1C>EQUAL 7.0%<8.0%: CPT | Performed by: INTERNAL MEDICINE

## 2025-04-07 PROCEDURE — 83036 HEMOGLOBIN GLYCOSYLATED A1C: CPT | Performed by: INTERNAL MEDICINE

## 2025-04-07 PROCEDURE — 2022F DILAT RTA XM EVC RTNOPTHY: CPT | Performed by: INTERNAL MEDICINE

## 2025-04-07 PROCEDURE — 1036F TOBACCO NON-USER: CPT | Performed by: INTERNAL MEDICINE

## 2025-04-07 RX ORDER — INSULIN DEGLUDEC 200 U/ML
70 INJECTION, SOLUTION SUBCUTANEOUS
Qty: 12 ML | Refills: 5 | Status: SHIPPED | OUTPATIENT
Start: 2025-04-07

## 2025-04-07 RX ORDER — DOXYCYCLINE HYCLATE 100 MG
100 TABLET ORAL 2 TIMES DAILY
Qty: 20 TABLET | Refills: 0 | Status: SHIPPED | OUTPATIENT
Start: 2025-04-07 | End: 2025-04-17

## 2025-04-07 RX ORDER — HYDROCHLOROTHIAZIDE 12.5 MG/1
12.5 CAPSULE ORAL EVERY MORNING
Qty: 90 CAPSULE | Refills: 1 | Status: SHIPPED | OUTPATIENT
Start: 2025-04-07

## 2025-04-07 RX ORDER — INSULIN ASPART 100 [IU]/ML
INJECTION, SOLUTION INTRAVENOUS; SUBCUTANEOUS
Qty: 15 ML | Refills: 10 | Status: SHIPPED | OUTPATIENT
Start: 2025-04-07

## 2025-04-07 RX ORDER — LEVOTHYROXINE SODIUM 88 UG/1
88 TABLET ORAL DAILY
Qty: 90 TABLET | Refills: 1 | Status: SHIPPED | OUTPATIENT
Start: 2025-04-07 | End: 2025-10-04

## 2025-04-07 RX ORDER — INSULIN ASPART 100 [IU]/ML
INJECTION, SOLUTION INTRAVENOUS; SUBCUTANEOUS
Qty: 15 ML | Refills: 10 | Status: SHIPPED
Start: 2025-04-07 | End: 2025-04-07 | Stop reason: DRUGHIGH

## 2025-04-07 RX ORDER — FLUTICASONE PROPIONATE 50 MCG
SPRAY, SUSPENSION (ML) NASAL
Qty: 16 G | Refills: 3 | Status: SHIPPED | OUTPATIENT
Start: 2025-04-07

## 2025-04-07 ASSESSMENT — ENCOUNTER SYMPTOMS
RESPIRATORY NEGATIVE: 1
FACIAL SWELLING: 0
RHINORRHEA: 0
ALLERGIC/IMMUNOLOGIC NEGATIVE: 1
SINUS PAIN: 0
EYE REDNESS: 0
EYES NEGATIVE: 1
GASTROINTESTINAL NEGATIVE: 1
EYE DISCHARGE: 0
EYE PAIN: 0
EYE ITCHING: 0

## 2025-04-07 NOTE — PROGRESS NOTES
current use of insulin ([facility])  -     Truong Cardenas DPM, Podiatry, [place]  -     Hemoglobin A1C; Future  -     POCT glycosylated hemoglobin (Hb A1C)  -     Insulin Degludec (TRESIBA FLEXTOUCH) 200 UNIT/ML SOPN; Inject 70 Units into the skin daily (with breakfast), Disp-12 mL, R-5Normal  2. Hyperthyroidism  -     levothyroxine (SYNTHROID) 88 MCG tablet; Take 1 tablet by mouth Daily, Disp-90 tablet, R-1Normal  -     T4, Free; Future  -     TSH; Future  3. Diabetic polyneuropathy associated with type 2 diabetes mellitus  -     Insulin Degludec (TRESIBA FLEXTOUCH) 200 UNIT/ML SOPN; Inject 70 Units into the skin daily (with breakfast), Disp-12 mL, R-5Normal  -     insulin aspart (NOVOLOG FLEXPEN) 100 UNIT/ML injection pen; INJECT 5 UNITS SUBCUTANEOUSLY WITH EACH MEAL IN ADDITION TO SLIDING SCALE <100=0 UNITS 100- 150=4U 151-200=6U 201-250=8U 251 -300=10U >300=12U CALL OFFICE >500 *MAX DAILY DOSE: 36 UNITS*, Disp-15 mL, R-10Normal  4. Essential (primary) hypertension  -     hydroCHLOROthiazide 12.5 MG capsule; Take 1 capsule by mouth every morning, Disp-90 capsule, R-1Normal  -     CBC; Future  -     Comprehensive Metabolic Panel; Future  5. Allergic rhinitis due to pollen, unspecified seasonality  -     fluticasone (FLONASE) 50 MCG/ACT nasal spray; INSTILL 1 SPRAY INTRANASALLY DAILY, Disp-16 g, R-3Normal  6. Body mass index (BMI) 70 or greater, adult  7. Vitamin D deficient osteomalacia  8. Mixed hyperlipidemia  -     Comprehensive Metabolic Panel; Future  -     Lipid, Fasting; Future      No follow-ups on file.       An electronic signature was used to authenticate this note.    --Maverick Davies MD

## 2025-04-23 DIAGNOSIS — Z79.4 TYPE 2 DIABETES MELLITUS WITH HYPERGLYCEMIA, WITH LONG-TERM CURRENT USE OF INSULIN (HCC): ICD-10-CM

## 2025-04-23 DIAGNOSIS — E11.65 TYPE 2 DIABETES MELLITUS WITH HYPERGLYCEMIA, WITH LONG-TERM CURRENT USE OF INSULIN (HCC): ICD-10-CM

## 2025-04-23 RX ORDER — BLOOD SUGAR DIAGNOSTIC
STRIP MISCELLANEOUS
Qty: 300 STRIP | Refills: 0 | Status: SHIPPED | OUTPATIENT
Start: 2025-04-23

## 2025-04-23 NOTE — TELEPHONE ENCOUNTER
Name of Medication(s) Requested:  Requested Prescriptions     Pending Prescriptions Disp Refills    blood glucose test strips (ONETOUCH ULTRA) strip [Pharmacy Med Name: OneTouch Ultra Test strips] 300 strip 0     Sig: USE AS DIRECTED TO TEST THREE TIMES DAILY and AS NEEDED FOR SYMPTOMS OF IRREGULAR BLOOD SUGAR       Medication is on current medication list Yes    Dosage and directions were verified? Yes    Quantity verified: 90 day supply     Pharmacy Verified?  Yes    Last Appointment:  4/7/2025    Future appts:  Future Appointments   Date Time Provider Department Center   4/29/2025  1:15 PM Truong Zhao Jr., [place] [facility]   7/15/2025 11:30 AM Maverick Davies MD [place] [facility] DEP        (If no appt send self scheduling link. .REFILLAPPT)  Scheduling request sent?     [] Yes  [] No    Does patient need updated?  [] Yes  [] No

## 2025-04-29 ENCOUNTER — OFFICE VISIT (OUTPATIENT)
Dept: PODIATRY | Age: 65
End: 2025-04-29
Payer: MEDICARE

## 2025-04-29 VITALS — BODY MASS INDEX: 50.02 KG/M2 | HEIGHT: 64 IN | WEIGHT: 293 LBS

## 2025-04-29 DIAGNOSIS — I89.0 LYMPHEDEMA OF BOTH LOWER EXTREMITIES: ICD-10-CM

## 2025-04-29 DIAGNOSIS — B35.1 ONYCHOMYCOSIS: Primary | ICD-10-CM

## 2025-04-29 DIAGNOSIS — E11.42 DIABETIC POLYNEUROPATHY ASSOCIATED WITH TYPE 2 DIABETES MELLITUS (HCC): ICD-10-CM

## 2025-04-29 DIAGNOSIS — B35.3 TINEA PEDIS OF BOTH FEET: ICD-10-CM

## 2025-04-29 DIAGNOSIS — R26.2 DIFFICULTY WALKING: ICD-10-CM

## 2025-04-29 PROCEDURE — G8417 CALC BMI ABV UP PARAM F/U: HCPCS | Performed by: PODIATRIST

## 2025-04-29 PROCEDURE — 3017F COLORECTAL CA SCREEN DOC REV: CPT | Performed by: PODIATRIST

## 2025-04-29 PROCEDURE — G8427 DOCREV CUR MEDS BY ELIG CLIN: HCPCS | Performed by: PODIATRIST

## 2025-04-29 PROCEDURE — 3051F HG A1C>EQUAL 7.0%<8.0%: CPT | Performed by: PODIATRIST

## 2025-04-29 PROCEDURE — 1036F TOBACCO NON-USER: CPT | Performed by: PODIATRIST

## 2025-04-29 PROCEDURE — 2022F DILAT RTA XM EVC RTNOPTHY: CPT | Performed by: PODIATRIST

## 2025-04-29 PROCEDURE — 99214 OFFICE O/P EST MOD 30 MIN: CPT | Performed by: PODIATRIST

## 2025-04-29 PROCEDURE — 11721 DEBRIDE NAIL 6 OR MORE: CPT | Performed by: PODIATRIST

## 2025-04-29 RX ORDER — NYSTATIN AND TRIAMCINOLONE ACETONIDE 100000; 1 [USP'U]/G; MG/G
CREAM TOPICAL
Qty: 60 G | Refills: 3 | Status: SHIPPED | OUTPATIENT
Start: 2025-04-29

## 2025-04-29 NOTE — PROGRESS NOTES
25     Nimisha Gordon    : 1960 Sex: female   Age: 64 y.o.    Patient was referred by: Maverick Davies MD  Patient's PCP/Provider is:  Maverick Davies MD    Subjective:    Patient seen today for diabetic foot evaluation and care    Chief Complaint   Patient presents with    Diabetes     Diabetic neuropathy        HPI: Patient stated she is still having chronic nail dystrophy issues and tinea pedis issues to both lower extremities.  Patient is still having the neuropathy symptoms to both lower extremities as well.  Patient has been trying to wear appropriate shoe gear with daily activities.  No other additional abnormalities noted at this time.    ROS:  Const: Positives and pertinent negatives as per HPI.     Musculo: Denies symptoms other than stated above.  Neuro: Denies symptoms other than stated above.  Skin: Denies symptoms other than stated above.    Current Medications:    Current Outpatient Medications:     nystatin-triamcinolone (MYCOLOG II) 330430-7.1 UNIT/GM-% cream, Apply topically 2 times daily., Disp: 60 g, Rfl: 3    blood glucose test strips (ONETOUCH ULTRA) strip, USE AS DIRECTED TO TEST THREE TIMES DAILY and AS NEEDED FOR SYMPTOMS OF IRREGULAR BLOOD SUGAR, Disp: 300 strip, Rfl: 0    levothyroxine (SYNTHROID) 88 MCG tablet, Take 1 tablet by mouth Daily, Disp: 90 tablet, Rfl: 1    hydroCHLOROthiazide 12.5 MG capsule, Take 1 capsule by mouth every morning, Disp: 90 capsule, Rfl: 1    fluticasone (FLONASE) 50 MCG/ACT nasal spray, INSTILL 1 SPRAY INTRANASALLY DAILY, Disp: 16 g, Rfl: 3    Insulin Degludec (TRESIBA FLEXTOUCH) 200 UNIT/ML SOPN, Inject 70 Units into the skin daily (with breakfast), Disp: 12 mL, Rfl: 5    insulin aspart (NOVOLOG FLEXPEN) 100 UNIT/ML injection pen, INJECT 5 UNITS SUBCUTANEOUSLY WITH EACH MEAL IN ADDITION TO SLIDING SCALE <100=0 UNITS 100- 150=4U 151-200=6U 201-250=8U 251 -300=10U >300=12U CALL OFFICE >500 *MAX DAILY DOSE: 36 UNITS*, Disp: 15 mL, Rfl: 10

## 2025-04-29 NOTE — PROGRESS NOTES
Patient here for diabetic foot exam, neuropathy.   Maverick Davies MD last visit 4/7/2025   Electronically signed by Suzanne Jewell LPN on 4/29/2025 at 1:04 PM

## 2025-05-07 ENCOUNTER — TELEPHONE (OUTPATIENT)
Dept: PRIMARY CARE CLINIC | Age: 65
End: 2025-05-07

## 2025-05-07 NOTE — TELEPHONE ENCOUNTER
[employer] called stating they need office visit to state she is unable to complete ADL's in her home and unable to use a walker for her to qualify for her own bariatric wheelchair  Please addend and fax to [employer] at [phone]    Advised that this wont be able to be completed until Dr Davies is back in office 05/20 and they were okay with that

## 2025-05-12 ENCOUNTER — TELEPHONE (OUTPATIENT)
Dept: PODIATRY | Age: 65
End: 2025-05-12

## 2025-05-12 NOTE — TELEPHONE ENCOUNTER
Needs clinical notes for PA for foot cream faxed to [phone]. Call back is [phone]. Ref # [id number]. Needs response before 5/21. Thanks!

## 2025-05-21 PROBLEM — K63.2 ABDOMINAL FISTULA: Status: ACTIVE | Noted: 2025-05-21

## 2025-06-09 RX ORDER — PEN NEEDLE, DIABETIC 31 GX5/16"
NEEDLE, DISPOSABLE MISCELLANEOUS
Qty: 200 EACH | Refills: 4 | Status: SHIPPED | OUTPATIENT
Start: 2025-06-09

## 2025-06-09 NOTE — TELEPHONE ENCOUNTER
Name of Medication(s) Requested:  Requested Prescriptions     Pending Prescriptions Disp Refills    B-D ULTRAFINE III SHORT PEN 31G X 8 MM MISC [Pharmacy Med Name: BD Ultra-Fine Short Pen Needle 31 gauge x 5/16\"] 200 each 4     Sig: USE AS DIRECTED WITH [employer] AND NOVOLOG SIX TIMES DAILY       Medication is on current medication list Yes    Dosage and directions were verified? Yes    Quantity verified: 90 day supply     Pharmacy Verified?  Yes    Last Appointment:  4/7/2025    Future appts:  Future Appointments   Date Time Provider Department Center   7/15/2025 11:30 AM Maverick Davies MD [place] [facility]   7/30/2025  1:00 PM Truong Zhao Jr., [place] [facility]        (If no appt send self scheduling link. .REFILLAPPT)  Scheduling request sent?     [] Yes  [x] No    Does patient need updated?  [] Yes  [x] No

## 2025-06-19 DIAGNOSIS — I10 ESSENTIAL (PRIMARY) HYPERTENSION: ICD-10-CM

## 2025-06-22 RX ORDER — LOSARTAN POTASSIUM 100 MG/1
100 TABLET ORAL DAILY
Qty: 30 TABLET | Refills: 4 | Status: SHIPPED | OUTPATIENT
Start: 2025-06-22

## 2025-06-22 RX ORDER — AMLODIPINE BESYLATE 5 MG/1
5 TABLET ORAL DAILY
Qty: 30 TABLET | Refills: 4 | Status: SHIPPED | OUTPATIENT
Start: 2025-06-22

## 2025-07-08 SDOH — HEALTH STABILITY: PHYSICAL HEALTH: ON AVERAGE, HOW MANY DAYS PER WEEK DO YOU ENGAGE IN MODERATE TO STRENUOUS EXERCISE (LIKE A BRISK WALK)?: 0 DAYS

## 2025-07-08 SDOH — HEALTH STABILITY: PHYSICAL HEALTH: ON AVERAGE, HOW MANY MINUTES DO YOU ENGAGE IN EXERCISE AT THIS LEVEL?: 0 MIN

## 2025-07-08 ASSESSMENT — LIFESTYLE VARIABLES
HOW MANY STANDARD DRINKS CONTAINING ALCOHOL DO YOU HAVE ON A TYPICAL DAY: PATIENT DOES NOT DRINK
HOW OFTEN DO YOU HAVE A DRINK CONTAINING ALCOHOL: 1
HOW OFTEN DO YOU HAVE SIX OR MORE DRINKS ON ONE OCCASION: 1
HOW MANY STANDARD DRINKS CONTAINING ALCOHOL DO YOU HAVE ON A TYPICAL DAY: 0
HOW OFTEN DO YOU HAVE A DRINK CONTAINING ALCOHOL: NEVER

## 2025-07-08 ASSESSMENT — PATIENT HEALTH QUESTIONNAIRE - PHQ9
SUM OF ALL RESPONSES TO PHQ QUESTIONS 1-9: 1
2. FEELING DOWN, DEPRESSED OR HOPELESS: NOT AT ALL
SUM OF ALL RESPONSES TO PHQ QUESTIONS 1-9: 1
1. LITTLE INTEREST OR PLEASURE IN DOING THINGS: SEVERAL DAYS

## 2025-07-15 ENCOUNTER — OFFICE VISIT (OUTPATIENT)
Dept: PRIMARY CARE CLINIC | Age: 65
End: 2025-07-15
Payer: MEDICARE

## 2025-07-15 VITALS
DIASTOLIC BLOOD PRESSURE: 70 MMHG | TEMPERATURE: 97.7 F | BODY MASS INDEX: 50.02 KG/M2 | HEART RATE: 72 BPM | OXYGEN SATURATION: 96 % | WEIGHT: 293 LBS | HEIGHT: 64 IN | SYSTOLIC BLOOD PRESSURE: 130 MMHG

## 2025-07-15 DIAGNOSIS — Z00.00 MEDICARE ANNUAL WELLNESS VISIT, SUBSEQUENT: Primary | ICD-10-CM

## 2025-07-15 DIAGNOSIS — E11.65 TYPE 2 DIABETES MELLITUS WITH HYPERGLYCEMIA, WITH LONG-TERM CURRENT USE OF INSULIN (HCC): ICD-10-CM

## 2025-07-15 DIAGNOSIS — M83.9 VITAMIN D DEFICIENT OSTEOMALACIA: ICD-10-CM

## 2025-07-15 DIAGNOSIS — I10 ESSENTIAL (PRIMARY) HYPERTENSION: ICD-10-CM

## 2025-07-15 DIAGNOSIS — Z79.4 TYPE 2 DIABETES MELLITUS WITH HYPERGLYCEMIA, WITH LONG-TERM CURRENT USE OF INSULIN (HCC): ICD-10-CM

## 2025-07-15 DIAGNOSIS — F33.1 MODERATE EPISODE OF RECURRENT MAJOR DEPRESSIVE DISORDER (HCC): ICD-10-CM

## 2025-07-15 DIAGNOSIS — Z12.31 ENCOUNTER FOR SCREENING MAMMOGRAM FOR BREAST CANCER: ICD-10-CM

## 2025-07-15 DIAGNOSIS — R60.0 LEG EDEMA: ICD-10-CM

## 2025-07-15 DIAGNOSIS — E11.42 DIABETIC POLYNEUROPATHY ASSOCIATED WITH TYPE 2 DIABETES MELLITUS (HCC): ICD-10-CM

## 2025-07-15 DIAGNOSIS — J30.1 ALLERGIC RHINITIS DUE TO POLLEN, UNSPECIFIED SEASONALITY: ICD-10-CM

## 2025-07-15 DIAGNOSIS — E78.2 MIXED HYPERLIPIDEMIA: ICD-10-CM

## 2025-07-15 DIAGNOSIS — E05.90 HYPERTHYROIDISM: ICD-10-CM

## 2025-07-15 PROCEDURE — 3017F COLORECTAL CA SCREEN DOC REV: CPT | Performed by: INTERNAL MEDICINE

## 2025-07-15 PROCEDURE — 3075F SYST BP GE 130 - 139MM HG: CPT | Performed by: INTERNAL MEDICINE

## 2025-07-15 PROCEDURE — 3051F HG A1C>EQUAL 7.0%<8.0%: CPT | Performed by: INTERNAL MEDICINE

## 2025-07-15 PROCEDURE — 3078F DIAST BP <80 MM HG: CPT | Performed by: INTERNAL MEDICINE

## 2025-07-15 PROCEDURE — 99214 OFFICE O/P EST MOD 30 MIN: CPT | Performed by: INTERNAL MEDICINE

## 2025-07-15 PROCEDURE — G0439 PPPS, SUBSEQ VISIT: HCPCS | Performed by: INTERNAL MEDICINE

## 2025-07-15 RX ORDER — FLUTICASONE PROPIONATE 50 MCG
SPRAY, SUSPENSION (ML) NASAL
Qty: 16 G | Refills: 3 | Status: SHIPPED | OUTPATIENT
Start: 2025-07-15

## 2025-07-15 RX ORDER — HYDROCHLOROTHIAZIDE 12.5 MG/1
12.5 CAPSULE ORAL EVERY MORNING
Qty: 90 CAPSULE | Refills: 1 | Status: SHIPPED | OUTPATIENT
Start: 2025-07-15

## 2025-07-15 RX ORDER — FUROSEMIDE 20 MG/1
20 TABLET ORAL DAILY
Qty: 30 TABLET | Refills: 4 | Status: SHIPPED | OUTPATIENT
Start: 2025-07-15

## 2025-07-15 RX ORDER — INSULIN DEGLUDEC 200 U/ML
70 INJECTION, SOLUTION SUBCUTANEOUS
Qty: 12 ML | Refills: 5 | Status: SHIPPED | OUTPATIENT
Start: 2025-07-15

## 2025-07-15 RX ORDER — MONTELUKAST SODIUM 10 MG/1
10 TABLET ORAL DAILY
Qty: 30 TABLET | Refills: 4 | Status: SHIPPED | OUTPATIENT
Start: 2025-07-15

## 2025-07-15 RX ORDER — INSULIN ASPART 100 [IU]/ML
INJECTION, SOLUTION INTRAVENOUS; SUBCUTANEOUS
Qty: 15 ML | Refills: 10 | Status: SHIPPED | OUTPATIENT
Start: 2025-07-15

## 2025-07-15 RX ORDER — LEVOTHYROXINE SODIUM 88 UG/1
88 TABLET ORAL DAILY
Qty: 90 TABLET | Refills: 1 | Status: SHIPPED | OUTPATIENT
Start: 2025-07-15 | End: 2026-01-11

## 2025-07-15 RX ORDER — VENLAFAXINE 75 MG/1
75 TABLET ORAL DAILY
Qty: 30 TABLET | Refills: 4 | Status: SHIPPED | OUTPATIENT
Start: 2025-07-15

## 2025-07-15 ASSESSMENT — ENCOUNTER SYMPTOMS
EYE PAIN: 0
SINUS PAIN: 0
GASTROINTESTINAL NEGATIVE: 1
RHINORRHEA: 0
EYES NEGATIVE: 1
EYE ITCHING: 0
EYE REDNESS: 0
FACIAL SWELLING: 0
RESPIRATORY NEGATIVE: 1
ALLERGIC/IMMUNOLOGIC NEGATIVE: 1
EYE DISCHARGE: 0

## 2025-07-15 ASSESSMENT — PATIENT HEALTH QUESTIONNAIRE - PHQ9
SUM OF ALL RESPONSES TO PHQ QUESTIONS 1-9: 8
2. FEELING DOWN, DEPRESSED OR HOPELESS: SEVERAL DAYS
1. LITTLE INTEREST OR PLEASURE IN DOING THINGS: SEVERAL DAYS
9. THOUGHTS THAT YOU WOULD BE BETTER OFF DEAD, OR OF HURTING YOURSELF: NOT AT ALL
8. MOVING OR SPEAKING SO SLOWLY THAT OTHER PEOPLE COULD HAVE NOTICED. OR THE OPPOSITE, BEING SO FIGETY OR RESTLESS THAT YOU HAVE BEEN MOVING AROUND A LOT MORE THAN USUAL: NOT AT ALL
5. POOR APPETITE OR OVEREATING: NOT AT ALL
SUM OF ALL RESPONSES TO PHQ QUESTIONS 1-9: 8
10. IF YOU CHECKED OFF ANY PROBLEMS, HOW DIFFICULT HAVE THESE PROBLEMS MADE IT FOR YOU TO DO YOUR WORK, TAKE CARE OF THINGS AT HOME, OR GET ALONG WITH OTHER PEOPLE: NOT DIFFICULT AT ALL
4. FEELING TIRED OR HAVING LITTLE ENERGY: NEARLY EVERY DAY
SUM OF ALL RESPONSES TO PHQ QUESTIONS 1-9: 8
SUM OF ALL RESPONSES TO PHQ QUESTIONS 1-9: 8
3. TROUBLE FALLING OR STAYING ASLEEP: NEARLY EVERY DAY
7. TROUBLE CONCENTRATING ON THINGS, SUCH AS READING THE NEWSPAPER OR WATCHING TELEVISION: NOT AT ALL
6. FEELING BAD ABOUT YOURSELF - OR THAT YOU ARE A FAILURE OR HAVE LET YOURSELF OR YOUR FAMILY DOWN: NOT AT ALL

## 2025-07-15 NOTE — ADDENDUM NOTE
Addended by: LISANDRA REID on: 7/15/2025 12:11 PM     Modules accepted: Orders, Level of Service

## 2025-08-06 LAB — DIABETIC RETINOPATHY: NEGATIVE

## 2025-08-25 DIAGNOSIS — E11.65 TYPE 2 DIABETES MELLITUS WITH HYPERGLYCEMIA, WITH LONG-TERM CURRENT USE OF INSULIN (HCC): ICD-10-CM

## 2025-08-25 DIAGNOSIS — Z79.4 TYPE 2 DIABETES MELLITUS WITH HYPERGLYCEMIA, WITH LONG-TERM CURRENT USE OF INSULIN (HCC): ICD-10-CM

## 2025-08-25 RX ORDER — BLOOD SUGAR DIAGNOSTIC
STRIP MISCELLANEOUS
Qty: 300 STRIP | Refills: 1 | Status: SHIPPED | OUTPATIENT
Start: 2025-08-25

## 2025-08-25 RX ORDER — ALCOHOL ANTISEPTIC PADS
PADS, MEDICATED (EA) TOPICAL
Qty: 300 EACH | Refills: 1 | Status: SHIPPED | OUTPATIENT
Start: 2025-08-25

## 2025-08-27 ENCOUNTER — PROCEDURE VISIT (OUTPATIENT)
Dept: PODIATRY | Age: 65
End: 2025-08-27
Payer: MEDICARE

## 2025-08-27 VITALS — BODY MASS INDEX: 50.02 KG/M2 | WEIGHT: 293 LBS | HEIGHT: 64 IN

## 2025-08-27 DIAGNOSIS — E11.42 DIABETIC POLYNEUROPATHY ASSOCIATED WITH TYPE 2 DIABETES MELLITUS (HCC): ICD-10-CM

## 2025-08-27 DIAGNOSIS — B35.3 TINEA PEDIS OF BOTH FEET: ICD-10-CM

## 2025-08-27 DIAGNOSIS — I89.0 LYMPHEDEMA OF BOTH LOWER EXTREMITIES: ICD-10-CM

## 2025-08-27 DIAGNOSIS — R26.2 DIFFICULTY WALKING: ICD-10-CM

## 2025-08-27 DIAGNOSIS — B35.1 ONYCHOMYCOSIS: Primary | ICD-10-CM

## 2025-08-27 PROCEDURE — 99999 PR OFFICE/OUTPT VISIT,PROCEDURE ONLY: CPT | Performed by: PODIATRIST

## 2025-08-27 PROCEDURE — 11721 DEBRIDE NAIL 6 OR MORE: CPT | Performed by: PODIATRIST

## (undated) DEVICE — NDLCTR: FOAM/MAG 40CT 64/CS: Brand: MEDICAL ACTION INDUSTRIES

## (undated) DEVICE — GAUZE,SPONGE,4"X4",16PLY,STRL,LF,10/TRAY: Brand: MEDLINE

## (undated) DEVICE — BLADE ES ELASTOMERIC COAT INSUL DURABLE BEND UPTO 90DEG

## (undated) DEVICE — DOUBLE BASIN SET: Brand: MEDLINE INDUSTRIES, INC.

## (undated) DEVICE — PACK,LAPAROTOMY,NO GOWNS: Brand: MEDLINE

## (undated) DEVICE — GAUZE,SPONGE,4"X4",16PLY,XRAY,STRL,LF: Brand: MEDLINE

## (undated) DEVICE — BANDAGE,GAUZE,4.5"X4.1YD,STERILE,LF: Brand: MEDLINE

## (undated) DEVICE — PAD,NON-ADHERENT,3X8,STERILE,LF,1/PK: Brand: MEDLINE

## (undated) DEVICE — GLOVE ORANGE PI 7 1/2   MSG9075

## (undated) DEVICE — SET SURG INSTR DISSECT

## (undated) DEVICE — SYRINGE IRRIG 60ML SFT PLIABLE BLB EZ TO GRP 1 HND USE W/

## (undated) DEVICE — GOWN,SIRUS,NONRNF,SETINSLV,XL,20/CS: Brand: MEDLINE

## (undated) DEVICE — TUBING, SUCTION, 1/4" X 10', STRAIGHT: Brand: MEDLINE

## (undated) DEVICE — SOLUTION IV IRRIG 500ML 0.9% SODIUM CHL 2F7123

## (undated) DEVICE — YANKAUER,BULB TIP,W/O VENT,RIGID,STERILE: Brand: MEDLINE

## (undated) DEVICE — INTENDED FOR TISSUE SEPARATION, AND OTHER PROCEDURES THAT REQUIRE A SHARP SURGICAL BLADE TO PUNCTURE OR CUT.: Brand: BARD-PARKER ® STAINLESS STEEL BLADES

## (undated) DEVICE — PENCIL ES L3M BTTN SWCH HOLSTER W/ BLDE ELECTRD EDGE

## (undated) DEVICE — MARKER,SKIN,WI/RULER AND LABELS: Brand: MEDLINE

## (undated) DEVICE — ELECTRODE PT RET AD L9FT HI MOIST COND ADH HYDRGEL CORDED

## (undated) DEVICE — GOWN,SIRUS,FABRNF,XL,20/CS: Brand: MEDLINE

## (undated) DEVICE — STANDARD HYPODERMIC NEEDLE,POLYPROPYLENE HUB: Brand: MONOJECT

## (undated) DEVICE — TRAY SKIN SCRB 2 OZ PVP- 1 BTL